# Patient Record
Sex: FEMALE | Race: ASIAN | NOT HISPANIC OR LATINO | URBAN - METROPOLITAN AREA
[De-identification: names, ages, dates, MRNs, and addresses within clinical notes are randomized per-mention and may not be internally consistent; named-entity substitution may affect disease eponyms.]

---

## 2018-02-25 ENCOUNTER — INPATIENT (INPATIENT)
Facility: HOSPITAL | Age: 83
LOS: 0 days | Discharge: LEFT BEFORE DISCHARGE | DRG: 639 | End: 2018-02-26
Attending: INTERNAL MEDICINE | Admitting: INTERNAL MEDICINE
Payer: MEDICARE

## 2018-02-25 VITALS
SYSTOLIC BLOOD PRESSURE: 165 MMHG | TEMPERATURE: 98 F | OXYGEN SATURATION: 100 % | DIASTOLIC BLOOD PRESSURE: 83 MMHG | RESPIRATION RATE: 20 BRPM | HEART RATE: 93 BPM

## 2018-02-25 DIAGNOSIS — H40.9 UNSPECIFIED GLAUCOMA: ICD-10-CM

## 2018-02-25 DIAGNOSIS — R55 SYNCOPE AND COLLAPSE: ICD-10-CM

## 2018-02-25 DIAGNOSIS — E11.9 TYPE 2 DIABETES MELLITUS WITHOUT COMPLICATIONS: ICD-10-CM

## 2018-02-25 DIAGNOSIS — E78.5 HYPERLIPIDEMIA, UNSPECIFIED: ICD-10-CM

## 2018-02-25 DIAGNOSIS — I10 ESSENTIAL (PRIMARY) HYPERTENSION: ICD-10-CM

## 2018-02-25 DIAGNOSIS — Z98.890 OTHER SPECIFIED POSTPROCEDURAL STATES: Chronic | ICD-10-CM

## 2018-02-25 LAB
ALBUMIN SERPL ELPH-MCNC: 4.2 G/DL — SIGNIFICANT CHANGE UP (ref 3.3–5)
ALP SERPL-CCNC: 60 U/L — SIGNIFICANT CHANGE UP (ref 40–120)
ALT FLD-CCNC: 22 U/L RC — SIGNIFICANT CHANGE UP (ref 10–45)
ANION GAP SERPL CALC-SCNC: 17 MMOL/L — SIGNIFICANT CHANGE UP (ref 5–17)
AST SERPL-CCNC: 35 U/L — SIGNIFICANT CHANGE UP (ref 10–40)
BASOPHILS # BLD AUTO: 0 K/UL — SIGNIFICANT CHANGE UP (ref 0–0.2)
BASOPHILS NFR BLD AUTO: 0.5 % — SIGNIFICANT CHANGE UP (ref 0–2)
BILIRUB SERPL-MCNC: 0.5 MG/DL — SIGNIFICANT CHANGE UP (ref 0.2–1.2)
BUN SERPL-MCNC: 21 MG/DL — SIGNIFICANT CHANGE UP (ref 7–23)
CALCIUM SERPL-MCNC: 9.1 MG/DL — SIGNIFICANT CHANGE UP (ref 8.4–10.5)
CHLORIDE SERPL-SCNC: 98 MMOL/L — SIGNIFICANT CHANGE UP (ref 96–108)
CK MB BLD-MCNC: 1.4 % — SIGNIFICANT CHANGE UP (ref 0–3.5)
CK MB BLD-MCNC: 3.1 % — SIGNIFICANT CHANGE UP (ref 0–3.5)
CK MB CFR SERPL CALC: 11.6 NG/ML — HIGH (ref 0–3.8)
CK MB CFR SERPL CALC: 12.6 NG/ML — HIGH (ref 0–3.8)
CK SERPL-CCNC: 412 U/L — HIGH (ref 25–170)
CK SERPL-CCNC: 848 U/L — HIGH (ref 25–170)
CO2 SERPL-SCNC: 24 MMOL/L — SIGNIFICANT CHANGE UP (ref 22–31)
CREAT SERPL-MCNC: 0.42 MG/DL — LOW (ref 0.5–1.3)
EOSINOPHIL # BLD AUTO: 0 K/UL — SIGNIFICANT CHANGE UP (ref 0–0.5)
EOSINOPHIL NFR BLD AUTO: 0.3 % — SIGNIFICANT CHANGE UP (ref 0–6)
GLUCOSE BLDC GLUCOMTR-MCNC: 119 MG/DL — HIGH (ref 70–99)
GLUCOSE SERPL-MCNC: 126 MG/DL — HIGH (ref 70–99)
HCT VFR BLD CALC: 37.3 % — SIGNIFICANT CHANGE UP (ref 34.5–45)
HGB BLD-MCNC: 12.5 G/DL — SIGNIFICANT CHANGE UP (ref 11.5–15.5)
LYMPHOCYTES # BLD AUTO: 0.6 K/UL — LOW (ref 1–3.3)
LYMPHOCYTES # BLD AUTO: 6 % — LOW (ref 13–44)
MCHC RBC-ENTMCNC: 31.5 PG — SIGNIFICANT CHANGE UP (ref 27–34)
MCHC RBC-ENTMCNC: 33.4 GM/DL — SIGNIFICANT CHANGE UP (ref 32–36)
MCV RBC AUTO: 94.3 FL — SIGNIFICANT CHANGE UP (ref 80–100)
MONOCYTES # BLD AUTO: 0.5 K/UL — SIGNIFICANT CHANGE UP (ref 0–0.9)
MONOCYTES NFR BLD AUTO: 5.3 % — SIGNIFICANT CHANGE UP (ref 2–14)
NEUTROPHILS # BLD AUTO: 8.8 K/UL — HIGH (ref 1.8–7.4)
NEUTROPHILS NFR BLD AUTO: 88 % — HIGH (ref 43–77)
PLATELET # BLD AUTO: 253 K/UL — SIGNIFICANT CHANGE UP (ref 150–400)
POTASSIUM SERPL-MCNC: 4.6 MMOL/L — SIGNIFICANT CHANGE UP (ref 3.5–5.3)
POTASSIUM SERPL-SCNC: 4.6 MMOL/L — SIGNIFICANT CHANGE UP (ref 3.5–5.3)
PROT SERPL-MCNC: 8.1 G/DL — SIGNIFICANT CHANGE UP (ref 6–8.3)
RBC # BLD: 3.96 M/UL — SIGNIFICANT CHANGE UP (ref 3.8–5.2)
RBC # FLD: 12.1 % — SIGNIFICANT CHANGE UP (ref 10.3–14.5)
SODIUM SERPL-SCNC: 139 MMOL/L — SIGNIFICANT CHANGE UP (ref 135–145)
TROPONIN T SERPL-MCNC: 0.01 NG/ML — SIGNIFICANT CHANGE UP (ref 0–0.06)
TROPONIN T SERPL-MCNC: <0.01 NG/ML — SIGNIFICANT CHANGE UP (ref 0–0.06)
WBC # BLD: 10 K/UL — SIGNIFICANT CHANGE UP (ref 3.8–10.5)
WBC # FLD AUTO: 10 K/UL — SIGNIFICANT CHANGE UP (ref 3.8–10.5)

## 2018-02-25 PROCEDURE — 72192 CT PELVIS W/O DYE: CPT | Mod: 26

## 2018-02-25 PROCEDURE — 73700 CT LOWER EXTREMITY W/O DYE: CPT | Mod: 26,RT

## 2018-02-25 PROCEDURE — 93010 ELECTROCARDIOGRAM REPORT: CPT

## 2018-02-25 PROCEDURE — 70450 CT HEAD/BRAIN W/O DYE: CPT | Mod: 26

## 2018-02-25 PROCEDURE — 73552 X-RAY EXAM OF FEMUR 2/>: CPT | Mod: 26,RT

## 2018-02-25 PROCEDURE — 73502 X-RAY EXAM HIP UNI 2-3 VIEWS: CPT | Mod: 26,RT

## 2018-02-25 PROCEDURE — 72125 CT NECK SPINE W/O DYE: CPT | Mod: 26

## 2018-02-25 PROCEDURE — 99285 EMERGENCY DEPT VISIT HI MDM: CPT | Mod: 25,GC

## 2018-02-25 PROCEDURE — 76377 3D RENDER W/INTRP POSTPROCES: CPT | Mod: 26

## 2018-02-25 PROCEDURE — 76377 3D RENDER W/INTRP POSTPROCES: CPT | Mod: 26,77

## 2018-02-25 RX ORDER — LATANOPROST 0.05 MG/ML
1 SOLUTION/ DROPS OPHTHALMIC; TOPICAL
Qty: 0 | Refills: 0 | Status: DISCONTINUED | OUTPATIENT
Start: 2018-02-25 | End: 2018-02-25

## 2018-02-25 RX ORDER — INFLUENZA VIRUS VACCINE 15; 15; 15; 15 UG/.5ML; UG/.5ML; UG/.5ML; UG/.5ML
0.5 SUSPENSION INTRAMUSCULAR ONCE
Qty: 0 | Refills: 0 | Status: DISCONTINUED | OUTPATIENT
Start: 2018-02-25 | End: 2018-02-26

## 2018-02-25 RX ORDER — DEXTROSE 50 % IN WATER 50 %
25 SYRINGE (ML) INTRAVENOUS ONCE
Qty: 0 | Refills: 0 | Status: DISCONTINUED | OUTPATIENT
Start: 2018-02-25 | End: 2018-02-26

## 2018-02-25 RX ORDER — LOSARTAN POTASSIUM 100 MG/1
50 TABLET, FILM COATED ORAL DAILY
Qty: 0 | Refills: 0 | Status: DISCONTINUED | OUTPATIENT
Start: 2018-02-25 | End: 2018-02-26

## 2018-02-25 RX ORDER — GLUCAGON INJECTION, SOLUTION 0.5 MG/.1ML
1 INJECTION, SOLUTION SUBCUTANEOUS ONCE
Qty: 0 | Refills: 0 | Status: DISCONTINUED | OUTPATIENT
Start: 2018-02-25 | End: 2018-02-26

## 2018-02-25 RX ORDER — POLYETHYLENE GLYCOL 3350 17 G/17G
17 POWDER, FOR SOLUTION ORAL DAILY
Qty: 0 | Refills: 0 | Status: DISCONTINUED | OUTPATIENT
Start: 2018-02-25 | End: 2018-02-26

## 2018-02-25 RX ORDER — HEPARIN SODIUM 5000 [USP'U]/ML
5000 INJECTION INTRAVENOUS; SUBCUTANEOUS EVERY 12 HOURS
Qty: 0 | Refills: 0 | Status: DISCONTINUED | OUTPATIENT
Start: 2018-02-25 | End: 2018-02-26

## 2018-02-25 RX ORDER — DEXTROSE 50 % IN WATER 50 %
12.5 SYRINGE (ML) INTRAVENOUS ONCE
Qty: 0 | Refills: 0 | Status: DISCONTINUED | OUTPATIENT
Start: 2018-02-25 | End: 2018-02-26

## 2018-02-25 RX ORDER — DEXTROSE 50 % IN WATER 50 %
1 SYRINGE (ML) INTRAVENOUS ONCE
Qty: 0 | Refills: 0 | Status: DISCONTINUED | OUTPATIENT
Start: 2018-02-25 | End: 2018-02-26

## 2018-02-25 RX ORDER — SODIUM CHLORIDE 9 MG/ML
1000 INJECTION, SOLUTION INTRAVENOUS
Qty: 0 | Refills: 0 | Status: DISCONTINUED | OUTPATIENT
Start: 2018-02-25 | End: 2018-02-26

## 2018-02-25 RX ORDER — INSULIN LISPRO 100/ML
VIAL (ML) SUBCUTANEOUS
Qty: 0 | Refills: 0 | Status: DISCONTINUED | OUTPATIENT
Start: 2018-02-25 | End: 2018-02-26

## 2018-02-25 RX ORDER — LATANOPROST 0.05 MG/ML
1 SOLUTION/ DROPS OPHTHALMIC; TOPICAL AT BEDTIME
Qty: 0 | Refills: 0 | Status: DISCONTINUED | OUTPATIENT
Start: 2018-02-25 | End: 2018-02-26

## 2018-02-25 RX ADMIN — LATANOPROST 1 DROP(S): 0.05 SOLUTION/ DROPS OPHTHALMIC; TOPICAL at 23:23

## 2018-02-25 NOTE — ED PROVIDER NOTE - OBJECTIVE STATEMENT
89yo F pmhx [place], DM, HTN p/w CC fall/LOC. Pt. poor historian, but explains that she went outside this morning and was then found by her neighbor in his backyard, unclear mechanism of fall, states it is because she "did not eat and felt weak". Unknown down time. Pt. accompanied by niece who states she is currently at her baseline. Pt. complaining of R hip pain. Denies HA CP SOB n/v/d.

## 2018-02-25 NOTE — H&P ADULT - HISTORY OF PRESENT ILLNESS
89yo F PMH [place], DM, HTN who was found by her neighbor in his backyard after an unwitnessed fall. The patient is a rather poor historian, but explains that she went outside this morning and was then found by her neighbor in his backyard, unclear mechanism of fall, states it is because she "did not eat and felt weak". Unknown down time. The patient is accompanied by her nieces who state she is currently at her baseline. The patient is complaining of R hip pain. Denies headache, chest pain nausea, vomiting preceding episode.

## 2018-02-25 NOTE — H&P ADULT - PROBLEM SELECTOR PLAN 2
check A1c  finger sticks with short acting insulin sliding scale  no oral meds for now  her episodes may well be secondary to recurrent hypoglycemia

## 2018-02-25 NOTE — ED ADULT NURSE REASSESSMENT NOTE - NS ED NURSE REASSESS COMMENT FT1
Report received from evon matias rn. Pt. is resting, easy work of breathing. on cardiac monitor, vss. Daughter at bedside. Safety maintained. Report received from evon matias rn. As per prior RN, MD mccormack confirmed urine sample not needed. Pt. is resting, easy work of breathing. on cardiac monitor, vss. Daughter at bedside. Safety maintained.

## 2018-02-25 NOTE — H&P ADULT - NSHPPHYSICALEXAM_GEN_ALL_CORE
PHYSICAL EXAM: vital signs as above  in no apparent distress  HEENT: QUIQUE EOMI bilaterally [place]  Neck: Supple, no JVD, no thyromegaly  Lungs: no rhonchi, no wheeze, no crackles  CVS: S1 S2 soft ejection systolic murmur best heard at left sternal border   Abdomen: no tenderness, no organomegaly, BS present  Neuro: AO x 3 no focal weakness, no sensory abnormalities  Psych: appropriate affect, talkative   Skin: warm, dry  Ext: no cyanosis or clubbing, bilateral + pitting edema  Msk: no joint swelling or deformities  Back: no CVA tenderness, no kyphosis/scoliosis

## 2018-02-25 NOTE — ED PROVIDER NOTE - CARDIAC PEDAL EDEMA
Problem: Non-pressure Ulcer Wound  Intervention: # Assess and measure wound every 7 days and if status is deteriorating.  Enter today's date indicating that the wound was measured.  This will produce a worklist task that will fire 7 days from the date entered to repeat the measurement.    Educated pt to cut off wraps if they become too loose or too tight, apply tubi  and call the clinic.         
absent

## 2018-02-25 NOTE — ED PROVIDER NOTE - MEDICAL DECISION MAKING DETAILS
89yo F pmhx HTN DM p/w CC unwitnessed fall, unknown mechanism. Will start syncope workup also XR R hip although atraumatic and ROM intact.

## 2018-02-25 NOTE — H&P ADULT - NSHPREVIEWOFSYSTEMS_GEN_ALL_CORE
Gen: no loss of wt or appetite  ENT: no dizziness or recent hearing loss  Ophth: no blurring of vision or loss of vision  Resp: No cough or sputum production  CVS: No chest pain or palpitations or orthopnea  GI: no N/V/D  : no dysuria, hematuria  Endo: no polyuria or excessive sweating  Neuro: no weakness or paresthesias  Psych: No suicidal or depressive ideation  Heme: No petechiae or easy bruising  Msk: No joint pain or swelling  Skin: No rash or itching  All other ROS negative

## 2018-02-25 NOTE — ED ADULT NURSE NOTE - OBJECTIVE STATEMENT
Pt BIBA from home s/p unwitnessed fall and unknown time on ground.  Pt lives at home with her niece, niece states that pt has no hx of dementia and was found outside in her neighbor's backyard this morning, unable to get up on her own.  Pt normally walks one block to get the morning newspaper every day on her own, somehow ended up in neighbor's backyard today, had no jacket on.  Pt states that she felt weak and fell down, has superficial abrasions above her R knee and redness to her L knee, no other ecchymosis or wounds.  Pt not a very good historian, but is coherent and very talkative, her niece at the bedside states that she at her baseline mentation.  Reports "muscle pain" in her R hip with movement but has no weakness in the leg, able to lift against gravity without difficulty or distress, no rotation.  Denies cp, sob, confusion, current weakness, ha, vision changes, or other symptoms. Pt BIBA from home s/p unwitnessed fall and unknown time on ground.  Pt lives at home with her niece, niece states that pt has no hx of dementia and was found outside in her neighbor's backyard this morning, unable to get up on her own.  Pt normally walks one block to get the morning newspaper every day on her own, somehow ended up in neighbor's backyard today, had no jacket on.  Pt states that she felt weak and fell down, denies LOC but is not able to recall who took her back to her house, has superficial abrasions above her R knee and redness to her L knee, no other ecchymosis or wounds.  Pt not a very good historian, but is coherent and very talkative, her niece at the bedside states that she at her baseline mentation.  Reports "muscle pain" in her R hip with movement but has no weakness in the leg, able to lift against gravity without difficulty or distress, no rotation.  Denies cp, sob, confusion, current weakness, ha, vision changes, or other symptoms.

## 2018-02-25 NOTE — ED ADULT NURSE REASSESSMENT NOTE - NS ED NURSE REASSESS COMMENT FT1
MD Love states no need to repeat cooags, pt currently sitting up in stretcher eating, will walk afterwards

## 2018-02-25 NOTE — ED ADULT NURSE REASSESSMENT NOTE - NS ED NURSE REASSESS COMMENT FT1
pt unable to walk, very unsteady on feet even with cane, family states she normally walks at home without any assistive devices, likely admit

## 2018-02-25 NOTE — ED PROVIDER NOTE - ATTENDING CONTRIBUTION TO CARE
89 y/o female who presents s/p fall that appears to have been mechanical as she denies having experienced any LOC, HA, visual disturbance, neck pain, CP, SOB, AP, N/V or focal weakness either prior to or after the event and presenting complaining only of pain in "the muscle" of her proximal right lower extremity. On exam, she appears very well and comfortable. VSs noted, head AT, neck supple c/ FROM s/ pain, paresthesia or midline c-spine ttp, lungs CTA, chest wall s/ ttp, cardiac sounds s/ audible m/r/g, abdomen soft, NT/ND, extremities s/ asymmetry c/ FROM X 4 including her R hip s/ any apparent hesitation or discomfort, skin s/ rash, abrasion or laceration and neurologically intact. Appropriate plain films appear unremarkable. Will attempt to ambulate. If she ambulates, she will be discharged in the care of those with whom she lives and who are bedside.

## 2018-02-25 NOTE — H&P ADULT - NSHPLABSRESULTS_GEN_ALL_CORE
noted on [place], including lab results and radiology studies   radiology studies notable for no fractures

## 2018-02-25 NOTE — H&P ADULT - PROBLEM SELECTOR PLAN 1
unclear etiology   cardiology attending to see  will follow recommendations   echocardiogram ordered for now

## 2018-02-25 NOTE — H&P ADULT - ASSESSMENT
89yo F PMH [place], DM, HTN who was found by her neighbor in his backyard after an unwitnessed fall, etiology unclear.

## 2018-02-25 NOTE — ED PROVIDER NOTE - SHIFT CHANGE DETAILS
Patient able to bear weight but unable to ambulate. CT ordered for further evaluation but even if negative, she will require admission due to her inability to ambulate and perhaps MRI for further evaluation of possible occult fracture.

## 2018-02-26 VITALS
HEART RATE: 74 BPM | SYSTOLIC BLOOD PRESSURE: 123 MMHG | TEMPERATURE: 98 F | OXYGEN SATURATION: 99 % | RESPIRATION RATE: 18 BRPM | DIASTOLIC BLOOD PRESSURE: 71 MMHG

## 2018-02-26 LAB
ANION GAP SERPL CALC-SCNC: 11 MMOL/L — SIGNIFICANT CHANGE UP (ref 5–17)
APPEARANCE UR: CLEAR — SIGNIFICANT CHANGE UP
BILIRUB UR-MCNC: NEGATIVE — SIGNIFICANT CHANGE UP
BUN SERPL-MCNC: 23 MG/DL — SIGNIFICANT CHANGE UP (ref 7–23)
CALCIUM SERPL-MCNC: 8.9 MG/DL — SIGNIFICANT CHANGE UP (ref 8.4–10.5)
CHLORIDE SERPL-SCNC: 101 MMOL/L — SIGNIFICANT CHANGE UP (ref 96–108)
CK MB BLD-MCNC: 1.1 % — SIGNIFICANT CHANGE UP (ref 0–3.5)
CK MB CFR SERPL CALC: 9.7 NG/ML — HIGH (ref 0–3.8)
CK SERPL-CCNC: 867 U/L — HIGH (ref 25–170)
CO2 SERPL-SCNC: 25 MMOL/L — SIGNIFICANT CHANGE UP (ref 22–31)
COLOR SPEC: YELLOW — SIGNIFICANT CHANGE UP
CREAT SERPL-MCNC: 0.56 MG/DL — SIGNIFICANT CHANGE UP (ref 0.5–1.3)
DIFF PNL FLD: ABNORMAL
EPI CELLS # UR: SIGNIFICANT CHANGE UP /HPF
GLUCOSE BLDC GLUCOMTR-MCNC: 117 MG/DL — HIGH (ref 70–99)
GLUCOSE BLDC GLUCOMTR-MCNC: 133 MG/DL — HIGH (ref 70–99)
GLUCOSE SERPL-MCNC: 107 MG/DL — HIGH (ref 70–99)
GLUCOSE UR QL: NEGATIVE — SIGNIFICANT CHANGE UP
HBA1C BLD-MCNC: 6.2 % — HIGH (ref 4–5.6)
HCT VFR BLD CALC: 32.9 % — LOW (ref 34.5–45)
HGB BLD-MCNC: 10.2 G/DL — LOW (ref 11.5–15.5)
KETONES UR-MCNC: NEGATIVE — SIGNIFICANT CHANGE UP
LEUKOCYTE ESTERASE UR-ACNC: ABNORMAL
MCHC RBC-ENTMCNC: 28.8 PG — SIGNIFICANT CHANGE UP (ref 27–34)
MCHC RBC-ENTMCNC: 31 GM/DL — LOW (ref 32–36)
MCV RBC AUTO: 92.9 FL — SIGNIFICANT CHANGE UP (ref 80–100)
NITRITE UR-MCNC: NEGATIVE — SIGNIFICANT CHANGE UP
PH UR: 6.5 — SIGNIFICANT CHANGE UP (ref 5–8)
PLATELET # BLD AUTO: 243 K/UL — SIGNIFICANT CHANGE UP (ref 150–400)
POTASSIUM SERPL-MCNC: 3.9 MMOL/L — SIGNIFICANT CHANGE UP (ref 3.5–5.3)
POTASSIUM SERPL-SCNC: 3.9 MMOL/L — SIGNIFICANT CHANGE UP (ref 3.5–5.3)
PROT UR-MCNC: NEGATIVE — SIGNIFICANT CHANGE UP
RBC # BLD: 3.54 M/UL — LOW (ref 3.8–5.2)
RBC # FLD: 13.7 % — SIGNIFICANT CHANGE UP (ref 10.3–14.5)
RBC CASTS # UR COMP ASSIST: ABNORMAL /HPF (ref 0–2)
SODIUM SERPL-SCNC: 137 MMOL/L — SIGNIFICANT CHANGE UP (ref 135–145)
SP GR SPEC: 1.02 — SIGNIFICANT CHANGE UP (ref 1.01–1.02)
TROPONIN T SERPL-MCNC: <0.01 NG/ML — SIGNIFICANT CHANGE UP (ref 0–0.06)
TSH SERPL-MCNC: 4.56 UIU/ML — HIGH (ref 0.27–4.2)
UROBILINOGEN FLD QL: NEGATIVE — SIGNIFICANT CHANGE UP
WBC # BLD: 6.9 K/UL — SIGNIFICANT CHANGE UP (ref 3.8–10.5)
WBC # FLD AUTO: 6.9 K/UL — SIGNIFICANT CHANGE UP (ref 3.8–10.5)
WBC UR QL: SIGNIFICANT CHANGE UP /HPF (ref 0–5)

## 2018-02-26 PROCEDURE — 83036 HEMOGLOBIN GLYCOSYLATED A1C: CPT

## 2018-02-26 PROCEDURE — 72125 CT NECK SPINE W/O DYE: CPT

## 2018-02-26 PROCEDURE — 72192 CT PELVIS W/O DYE: CPT

## 2018-02-26 PROCEDURE — 82550 ASSAY OF CK (CPK): CPT

## 2018-02-26 PROCEDURE — 73502 X-RAY EXAM HIP UNI 2-3 VIEWS: CPT

## 2018-02-26 PROCEDURE — 84443 ASSAY THYROID STIM HORMONE: CPT

## 2018-02-26 PROCEDURE — 93970 EXTREMITY STUDY: CPT | Mod: 26

## 2018-02-26 PROCEDURE — 84484 ASSAY OF TROPONIN QUANT: CPT

## 2018-02-26 PROCEDURE — 99285 EMERGENCY DEPT VISIT HI MDM: CPT | Mod: 25

## 2018-02-26 PROCEDURE — 80053 COMPREHEN METABOLIC PANEL: CPT

## 2018-02-26 PROCEDURE — 82962 GLUCOSE BLOOD TEST: CPT

## 2018-02-26 PROCEDURE — 83735 ASSAY OF MAGNESIUM: CPT

## 2018-02-26 PROCEDURE — 72190 X-RAY EXAM OF PELVIS: CPT

## 2018-02-26 PROCEDURE — 93005 ELECTROCARDIOGRAM TRACING: CPT

## 2018-02-26 PROCEDURE — 82553 CREATINE MB FRACTION: CPT

## 2018-02-26 PROCEDURE — 70450 CT HEAD/BRAIN W/O DYE: CPT

## 2018-02-26 PROCEDURE — 87086 URINE CULTURE/COLONY COUNT: CPT

## 2018-02-26 PROCEDURE — 73700 CT LOWER EXTREMITY W/O DYE: CPT

## 2018-02-26 PROCEDURE — 80048 BASIC METABOLIC PNL TOTAL CA: CPT

## 2018-02-26 PROCEDURE — 81001 URINALYSIS AUTO W/SCOPE: CPT

## 2018-02-26 PROCEDURE — 93970 EXTREMITY STUDY: CPT

## 2018-02-26 PROCEDURE — 85027 COMPLETE CBC AUTOMATED: CPT

## 2018-02-26 PROCEDURE — 84100 ASSAY OF PHOSPHORUS: CPT

## 2018-02-26 PROCEDURE — 73552 X-RAY EXAM OF FEMUR 2/>: CPT

## 2018-02-26 PROCEDURE — 76377 3D RENDER W/INTRP POSTPROCES: CPT

## 2018-02-26 RX ADMIN — HEPARIN SODIUM 5000 UNIT(S): 5000 INJECTION INTRAVENOUS; SUBCUTANEOUS at 05:20

## 2018-02-26 RX ADMIN — LOSARTAN POTASSIUM 50 MILLIGRAM(S): 100 TABLET, FILM COATED ORAL at 05:20

## 2018-02-26 NOTE — PROGRESS NOTE ADULT - PROBLEM SELECTOR PLAN 2
A1c 6.2   finger sticks with short acting insulin sliding scale  no oral meds for now  her episodes may well be secondary to recurrent hypoglycemia  no hypoglycemia observed so far however

## 2018-02-26 NOTE — PROGRESS NOTE ADULT - ASSESSMENT
89yo F PMH [place], DM, HTN who was found by her neighbor in his backyard after an unwitnessed fall, etiology unclear.

## 2018-02-26 NOTE — CONSULT NOTE ADULT - ASSESSMENT
89 year old woman with history of DM, HTN admitted s/p fall vs syncope.    1. Fall vs syncope  no obvious cardiac etiology  check echo to r/o cmp, valve disease  hydrate  check orthostatics  r/o hip fx    2. Hypertension  cont losartan    dvt ppx 89 year old woman with history of DM, HTN admitted s/p fall vs syncope.    1. Fall vs syncope  no obvious cardiac etiology  check echo to r/o cmp, valve disease  hydrate  check orthostatics  r/o hip fx  f/u ekg    2. Hypertension  cont losartan    dvt ppx

## 2018-02-26 NOTE — CONSULT NOTE ADULT - SUBJECTIVE AND OBJECTIVE BOX
CARDIOLOGY CONSULT NOTE - DR. BILLINGS    CHIEF COMPLAINT/REASON FOR CONSULT:    HPI:  Patient is a 89 year old woman with history of DM, HTN admitted s/p fall vs syncope.  She denies any preceding symptoms  Patient denies any chest pain, dyspnea, palpitations, cough, syncope, edema, exertional symptoms, nausea, abdominal pain, fever, chills,  or rash.  Denies focal neuro deficit  Denies any history of CAD, MI, valve disease, cardiomyopathy, or congenital heart disease.    PAST MEDICAL & SURGICAL HISTORY:  Glaucoma  HLD (hyperlipidemia)  Diabetes  Hypertension  H/O eye surgery: right eye glaucoma surgery in the past        PREVIOUS DIAGNOSTIC TESTING:    [ ] Echocardiogram:  [ ]  Catheterization:  [ ] Stress Test:  	    MEDICATIONS:  MEDICATIONS  (STANDING):  dextrose 5%. 1000 milliLiter(s) (50 mL/Hr) IV Continuous <Continuous>  dextrose 50% Injectable 12.5 Gram(s) IV Push once  dextrose 50% Injectable 25 Gram(s) IV Push once  dextrose 50% Injectable 25 Gram(s) IV Push once  heparin  Injectable 5000 Unit(s) SubCutaneous every 12 hours  influenza   Vaccine 0.5 milliLiter(s) IntraMuscular once  insulin lispro (HumaLOG) corrective regimen sliding scale   SubCutaneous three times a day before meals  latanoprost 0.005% Ophthalmic Solution 1 Drop(s) Both EYES at bedtime  losartan 50 milliGRAM(s) Oral daily  polyethylene glycol 3350 17 Gram(s) Oral daily      FAMILY HISTORY:  No pertinent family history in first degree relatives      SOCIAL HISTORY:    [x ] Non-smoker  [ ] Smoker  [ ] Alcohol    Allergies    No Known Allergies    Intolerances    	    REVIEW OF SYSTEMS:  CONSTITUTIONAL: No fever, weight loss, or fatigue  EYES: No eye pain, visual disturbances, or discharge  ENMT:  No difficulty hearing, tinnitus, vertigo; No sinus or throat pain  NECK: No pain or stiffness  RESPIRATORY: No cough, wheezing, chills or hemoptysis; No Shortness of Breath  CARDIOVASCULAR: No chest pain, palpitations, passing out, dizziness, or leg swelling  GASTROINTESTINAL: No abdominal or epigastric pain. No nausea, vomiting, or hematemesis; No diarrhea or constipation. No melena or hematochezia.  GENITOURINARY: No dysuria, frequency, hematuria, or incontinence  NEUROLOGICAL: No headaches, memory loss, loss of strength, numbness, or tremors  SKIN: No itching, burning, rashes, or lesions   	    [ ] All others negative	  [ ] Unable to obtain    PHYSICAL EXAM:  T(C): 36.4 (02-26-18 @ 07:25), Max: 36.9 (02-25-18 @ 19:19)  HR: 74 (02-26-18 @ 07:25) (68 - 95)  BP: 123/71 (02-26-18 @ 07:25) (116/53 - 165/85)  RR: 18 (02-26-18 @ 07:25) (18 - 22)  SpO2: 99% (02-26-18 @ 07:25) (97% - 100%)  Wt(kg): --  I&O's Summary      Appearance: Normal	  Psychiatry: A & O x 3, Mood & affect appropriate  HEENT:   Normal oral mucosa, PERRL, EOMI	  Lymphatic: No lymphadenopathy  Cardiovascular: Normal S1 S2,RRR, sm  Respiratory: Lungs clear to auscultation	  Gastrointestinal:  Soft, Non-tender, + BS	  Skin: No rashes, No ecchymoses, No cyanosis	  Neurologic: Non-focal  Extremities: Normal range of motion, No clubbing, cyanosis or edema      TELEMETRY: 	    ECG:  	could not retrieve er ekg  RADIOLOGY:  OTHER: 	  	  LABS:	 	    CARDIAC MARKERS:                                  10.2   6.90  )-----------( 243      ( 26 Feb 2018 07:03 )             32.9     02-26    137  |  101  |  23  ----------------------------<  107<H>  3.9   |  25  |  0.56    Ca    8.9      26 Feb 2018 05:46  Phos  3.1     02-25  Mg     1.9     02-25    TPro  8.1  /  Alb  4.2  /  TBili  0.5  /  DBili  x   /  AST  35  /  ALT  22  /  AlkPhos  60  02-25      proBNP:   Lipid Profile:   HgA1c: Hemoglobin A1C, Whole Blood: 6.2 % (02-26 @ 07:03)    TSH: Thyroid Stimulating Hormone, Serum: 4.56 uIU/mL (02-26 @ 07:01)      ASSESSMENT/PLAN:

## 2018-02-26 NOTE — CHART NOTE - NSCHARTNOTEFT_GEN_A_CORE
89 year old female who is A+O x 3 is  leaving AMA, discussed risks of leaving with patient and her niece's but despite these risks she is insistent on leaving. She will be going home with her niece who she lives with and will follow up with her PMD, d/w Dr Mckeon

## 2018-02-26 NOTE — PROGRESS NOTE ADULT - SUBJECTIVE AND OBJECTIVE BOX
Patient is a 89y old  Female who presents with a chief complaint of unwitnessed fall (2018 20:42)      SUBJECTIVE / OVERNIGHT EVENTS: no overnight events  feels  'fine'    ROS:  Resp: No cough no sputum production  CVS: No chest pain no palpitations no orthopnea  GI: no N/V/D  : no dysuria, no hematuria  Neuro: no weakness no paresthesias  Heme: No petechiae no easy bruising  Msk: No joint pain no swelling  Skin: No rash no itching  All other ROS negative       MEDICATIONS  (STANDING):  dextrose 5%. 1000 milliLiter(s) (50 mL/Hr) IV Continuous <Continuous>  dextrose 50% Injectable 12.5 Gram(s) IV Push once  dextrose 50% Injectable 25 Gram(s) IV Push once  dextrose 50% Injectable 25 Gram(s) IV Push once  heparin  Injectable 5000 Unit(s) SubCutaneous every 12 hours  influenza   Vaccine 0.5 milliLiter(s) IntraMuscular once  insulin lispro (HumaLOG) corrective regimen sliding scale   SubCutaneous three times a day before meals  latanoprost 0.005% Ophthalmic Solution 1 Drop(s) Both EYES at bedtime  losartan 50 milliGRAM(s) Oral daily  polyethylene glycol 3350 17 Gram(s) Oral daily    MEDICATIONS  (PRN):  dextrose Gel 1 Dose(s) Oral once PRN Blood Glucose LESS THAN 70 milliGRAM(s)/deciliter  glucagon  Injectable 1 milliGRAM(s) IntraMuscular once PRN Glucose LESS THAN 70 milligrams/deciliter        CAPILLARY BLOOD GLUCOSE      POCT Blood Glucose.: 117 mg/dL (2018 08:53)  POCT Blood Glucose.: 119 mg/dL (2018 21:09)  POCT Blood Glucose.: 119 mg/dL (2018 12:53)    I&O's Summary    PHYSICAL EXAM: vital signs as above  in no apparent distress  HEENT: QUIQUE EOMI bilaterally [place]  Neck: Supple, no JVD, no thyromegaly  Lungs: no rhonchi, no wheeze, no crackles  CVS: S1 S2 soft ejection systolic murmur best heard at left sternal border   Abdomen: no tenderness, no organomegaly, BS present  Neuro: AO x 3 no focal weakness, no sensory abnormalities  Psych: appropriate affect, talkative   Skin: warm, dry  Ext: no cyanosis or clubbing, bilateral + pitting edema improved overnight   Msk: no joint swelling or deformities  Back: no CVA tenderness, no kyphosis/scoliosis    LABS:                        10.2   6.90  )-----------( 243      ( 2018 07:03 )             32.9         137  |  101  |  23  ----------------------------<  107<H>  3.9   |  25  |  0.56    Ca    8.9      2018 05:46  Phos  3.1     -  Mg     1.9         TPro  8.1  /  Alb  4.2  /  TBili  0.5  /  DBili  x   /  AST  35  /  ALT  22  /  AlkPhos  60        CARDIAC MARKERS ( 2018 05:46 )  x     / <0.01 ng/mL / 867 U/L / x     / 9.7 ng/mL  CARDIAC MARKERS ( 2018 22:13 )  x     / <0.01 ng/mL / 848 U/L / x     / 11.6 ng/mL  CARDIAC MARKERS ( 2018 13:09 )  x     / 0.01 ng/mL / 412 U/L / x     / 12.6 ng/mL      Urinalysis Basic - ( 2018 05:15 )    Color: Yellow / Appearance: Clear / S.020 / pH: x  Gluc: x / Ketone: Negative  / Bili: Negative / Urobili: Negative   Blood: x / Protein: Negative / Nitrite: Negative   Leuk Esterase: Small / RBC: 5-10 /HPF / WBC 6-10 /HPF   Sq Epi: x / Non Sq Epi: OCC /HPF / Bacteria: x          Consultant(s) Notes Reviewed:      Care Discussed with Consultants/Other Providers:    Contact Number, Dr Mckeon [phone]

## 2018-02-26 NOTE — PROGRESS NOTE ADULT - ATTENDING COMMENTS
discussed with patient in detail, all questions answered  discussed with family at bedside in detail  d/w cardiology attending

## 2018-02-26 NOTE — PROGRESS NOTE ADULT - PROBLEM SELECTOR PLAN 1
unclear etiology   cardiology attending help appreciated   will follow recommendations   echocardiogram pending

## 2018-02-27 LAB
CULTURE RESULTS: SIGNIFICANT CHANGE UP
SPECIMEN SOURCE: SIGNIFICANT CHANGE UP

## 2020-07-02 ENCOUNTER — INPATIENT (INPATIENT)
Facility: HOSPITAL | Age: 85
LOS: 5 days | Discharge: NURSING, REHAB OR LONG-TERM CARE | DRG: 872 | End: 2020-07-08
Attending: HOSPITALIST | Admitting: STUDENT IN AN ORGANIZED HEALTH CARE EDUCATION/TRAINING PROGRAM
Payer: MEDICARE

## 2020-07-02 VITALS
OXYGEN SATURATION: 96 % | SYSTOLIC BLOOD PRESSURE: 105 MMHG | WEIGHT: 89.95 LBS | HEART RATE: 85 BPM | HEIGHT: 59 IN | TEMPERATURE: 99 F | DIASTOLIC BLOOD PRESSURE: 61 MMHG | RESPIRATION RATE: 20 BRPM

## 2020-07-02 DIAGNOSIS — Z98.890 OTHER SPECIFIED POSTPROCEDURAL STATES: Chronic | ICD-10-CM

## 2020-07-02 LAB
ALBUMIN SERPL ELPH-MCNC: 3.6 G/DL — SIGNIFICANT CHANGE UP (ref 3.3–5)
ALP SERPL-CCNC: 113 U/L — SIGNIFICANT CHANGE UP (ref 40–120)
ALT FLD-CCNC: 126 U/L — HIGH (ref 10–45)
ANION GAP SERPL CALC-SCNC: 14 MMOL/L — SIGNIFICANT CHANGE UP (ref 5–17)
AST SERPL-CCNC: 275 U/L — HIGH (ref 10–40)
BASE EXCESS BLDV CALC-SCNC: 0.8 MMOL/L — SIGNIFICANT CHANGE UP (ref -2–2)
BASOPHILS # BLD AUTO: 0 K/UL — SIGNIFICANT CHANGE UP (ref 0–0.2)
BASOPHILS NFR BLD AUTO: 0 % — SIGNIFICANT CHANGE UP (ref 0–2)
BILIRUB SERPL-MCNC: 0.5 MG/DL — SIGNIFICANT CHANGE UP (ref 0.2–1.2)
BUN SERPL-MCNC: 16 MG/DL — SIGNIFICANT CHANGE UP (ref 7–23)
CA-I SERPL-SCNC: 1.13 MMOL/L — SIGNIFICANT CHANGE UP (ref 1.12–1.3)
CALCIUM SERPL-MCNC: 8.8 MG/DL — SIGNIFICANT CHANGE UP (ref 8.4–10.5)
CHLORIDE BLDV-SCNC: 101 MMOL/L — SIGNIFICANT CHANGE UP (ref 96–108)
CHLORIDE SERPL-SCNC: 96 MMOL/L — SIGNIFICANT CHANGE UP (ref 96–108)
CO2 BLDV-SCNC: 25 MMOL/L — SIGNIFICANT CHANGE UP (ref 22–30)
CO2 SERPL-SCNC: 20 MMOL/L — LOW (ref 22–31)
CREAT SERPL-MCNC: 0.59 MG/DL — SIGNIFICANT CHANGE UP (ref 0.5–1.3)
EOSINOPHIL # BLD AUTO: 0 K/UL — SIGNIFICANT CHANGE UP (ref 0–0.5)
EOSINOPHIL NFR BLD AUTO: 0 % — SIGNIFICANT CHANGE UP (ref 0–6)
GAS PNL BLDV: 130 MMOL/L — LOW (ref 135–145)
GAS PNL BLDV: SIGNIFICANT CHANGE UP
GIANT PLATELETS BLD QL SMEAR: PRESENT — SIGNIFICANT CHANGE UP
GLUCOSE BLDV-MCNC: 152 MG/DL — HIGH (ref 70–99)
GLUCOSE SERPL-MCNC: 152 MG/DL — HIGH (ref 70–99)
HCO3 BLDV-SCNC: 24 MMOL/L — SIGNIFICANT CHANGE UP (ref 21–29)
HCT VFR BLD CALC: 36.5 % — SIGNIFICANT CHANGE UP (ref 34.5–45)
HCT VFR BLDA CALC: 38 % — LOW (ref 39–50)
HGB BLD CALC-MCNC: 12.5 G/DL — SIGNIFICANT CHANGE UP (ref 11.5–15.5)
HGB BLD-MCNC: 12.1 G/DL — SIGNIFICANT CHANGE UP (ref 11.5–15.5)
LACTATE BLDV-MCNC: 2 MMOL/L — SIGNIFICANT CHANGE UP (ref 0.7–2)
LYMPHOCYTES # BLD AUTO: 1.05 K/UL — SIGNIFICANT CHANGE UP (ref 1–3.3)
LYMPHOCYTES # BLD AUTO: 11.3 % — LOW (ref 13–44)
MANUAL SMEAR VERIFICATION: SIGNIFICANT CHANGE UP
MCHC RBC-ENTMCNC: 29.6 PG — SIGNIFICANT CHANGE UP (ref 27–34)
MCHC RBC-ENTMCNC: 33.2 GM/DL — SIGNIFICANT CHANGE UP (ref 32–36)
MCV RBC AUTO: 89.2 FL — SIGNIFICANT CHANGE UP (ref 80–100)
MONOCYTES # BLD AUTO: 0.24 K/UL — SIGNIFICANT CHANGE UP (ref 0–0.9)
MONOCYTES NFR BLD AUTO: 2.6 % — SIGNIFICANT CHANGE UP (ref 2–14)
MYELOCYTES NFR BLD: 0.9 % — HIGH (ref 0–0)
NEUTROPHILS # BLD AUTO: 7.51 K/UL — HIGH (ref 1.8–7.4)
NEUTROPHILS NFR BLD AUTO: 70.4 % — SIGNIFICANT CHANGE UP (ref 43–77)
NEUTS BAND # BLD: 10.4 % — HIGH (ref 0–8)
PCO2 BLDV: 36 MMHG — SIGNIFICANT CHANGE UP (ref 35–50)
PH BLDV: 7.44 — SIGNIFICANT CHANGE UP (ref 7.35–7.45)
PLAT MORPH BLD: NORMAL — SIGNIFICANT CHANGE UP
PLATELET # BLD AUTO: 190 K/UL — SIGNIFICANT CHANGE UP (ref 150–400)
PO2 BLDV: 44 MMHG — SIGNIFICANT CHANGE UP (ref 25–45)
POTASSIUM BLDV-SCNC: 4.1 MMOL/L — SIGNIFICANT CHANGE UP (ref 3.5–5.3)
POTASSIUM SERPL-MCNC: 4.2 MMOL/L — SIGNIFICANT CHANGE UP (ref 3.5–5.3)
POTASSIUM SERPL-SCNC: 4.2 MMOL/L — SIGNIFICANT CHANGE UP (ref 3.5–5.3)
PROT SERPL-MCNC: 7.1 G/DL — SIGNIFICANT CHANGE UP (ref 6–8.3)
RBC # BLD: 4.09 M/UL — SIGNIFICANT CHANGE UP (ref 3.8–5.2)
RBC # FLD: 14.1 % — SIGNIFICANT CHANGE UP (ref 10.3–14.5)
RBC BLD AUTO: SIGNIFICANT CHANGE UP
SAO2 % BLDV: 78 % — SIGNIFICANT CHANGE UP (ref 67–88)
SARS-COV-2 RNA SPEC QL NAA+PROBE: SIGNIFICANT CHANGE UP
SODIUM SERPL-SCNC: 130 MMOL/L — LOW (ref 135–145)
TROPONIN T, HIGH SENSITIVITY RESULT: 11 NG/L — SIGNIFICANT CHANGE UP (ref 0–51)
TROPONIN T, HIGH SENSITIVITY RESULT: 12 NG/L — SIGNIFICANT CHANGE UP (ref 0–51)
VARIANT LYMPHS # BLD: 4.4 % — SIGNIFICANT CHANGE UP (ref 0–6)
WBC # BLD: 9.29 K/UL — SIGNIFICANT CHANGE UP (ref 3.8–10.5)
WBC # FLD AUTO: 9.29 K/UL — SIGNIFICANT CHANGE UP (ref 3.8–10.5)

## 2020-07-02 PROCEDURE — 99285 EMERGENCY DEPT VISIT HI MDM: CPT | Mod: CS,GC

## 2020-07-02 PROCEDURE — 72170 X-RAY EXAM OF PELVIS: CPT | Mod: 26

## 2020-07-02 PROCEDURE — 93010 ELECTROCARDIOGRAM REPORT: CPT | Mod: GC

## 2020-07-02 PROCEDURE — 71045 X-RAY EXAM CHEST 1 VIEW: CPT | Mod: 26

## 2020-07-02 PROCEDURE — 70450 CT HEAD/BRAIN W/O DYE: CPT | Mod: 26

## 2020-07-02 PROCEDURE — 74177 CT ABD & PELVIS W/CONTRAST: CPT | Mod: 26

## 2020-07-02 RX ORDER — SODIUM CHLORIDE 9 MG/ML
1000 INJECTION, SOLUTION INTRAVENOUS ONCE
Refills: 0 | Status: COMPLETED | OUTPATIENT
Start: 2020-07-02 | End: 2020-07-02

## 2020-07-02 RX ADMIN — SODIUM CHLORIDE 1000 MILLILITER(S): 9 INJECTION, SOLUTION INTRAVENOUS at 23:52

## 2020-07-02 NOTE — ED PROVIDER NOTE - CLINICAL SUMMARY MEDICAL DECISION MAKING FREE TEXT BOX
DH: 89 year old female BIBEMS for generalized weakness, fatigue, and decreased PO intake per EMS. Patient awake, unable to have conversation 2/2 ?language barrier. Family unreachable. Plan for ekg, labs, CT head, UA/UCx r/o UTI, reassess. DH: 89 year old female BIBEMS for generalized weakness, fatigue, and decreased PO intake per EMS. Patient awake, unable to have conversation 2/2 ?language barrier. Family unreachable. Plan for ekg, labs, CT head, UA/UCx r/o UTI, reassess.    Attending Statement: Agree with the above.  89 y F BIBEMS from ?home? c complaint of weakness, decreased PO.  Unable to call family for collateral information (no answer and given numbers and patient has no phone numbers available on her person).  Pt mandarin speaking though knows some english on my examination, able to say her name and that she is in a hospital but otherwise is unable to provide meaningful hx.  Reported to have a h/o dementia.  Aside from malaise/lethargy patient is nonfocal on examination.  Broad ddx including metabolic, infectious, CNS/vascular pathology.  Possible acute coronary syndrome though EKG is nsr without elevations.  Plan as above.  Will require admission.  --BMM

## 2020-07-02 NOTE — ED ADULT NURSE REASSESSMENT NOTE - NS ED NURSE REASSESS COMMENT FT1
Pt straight cathed using sterile technique. Pt tolerated procedure well. 400 ccs of dark yellow urine output. Sterile specimen collected. UA and Culture sent.

## 2020-07-02 NOTE — ED PROVIDER NOTE - PHYSICAL EXAMINATION
GENERAL: awake and looking around, unable to communicate 2/2 ?language barrier  HEENT: NCAT, EOMI, oral mucosa moist, normal conjunctiva  RESP: CTAB, no respiratory distress, no wheezes/rhonchi/rales  CV: RRR, no murmurs/rubs/gallops  ABDOMEN: soft, non-tender, non-distended, no guarding  MSK: no visible deformities  SKIN: warm, normal color, well perfused, no rash    -Nik Aguilera, PGY2

## 2020-07-02 NOTE — ED ADULT NURSE NOTE - OBJECTIVE STATEMENT
89y female brought in by EMS c/o generalized weakness. Hx of HTN, HLD, DM and [place]. Pt is Mandarin speaking. As per EMS, son reports pt's alzheimer's is progressing. Pt has been weak, normally ambulatory at baseline but now requiring assistance to ambulate. EMS also states son report poor PO intake.     Pt present to ED at 1855. Pt awake and calm. EMS report VSS. [facility]. Physical exam to be performed.

## 2020-07-02 NOTE — ED ADULT NURSE REASSESSMENT NOTE - NS ED NURSE REASSESS COMMENT FT1
Report received from ISABELA MARQUES in [facility]. Pt currently does not appear to be in acute distress. Pt does not appear to be SOB, pt does not have non-verbal indicators of chest pain, n/v, issues with bowel or bladder such has diarrhea or incontinence. pt currently resting. pt awaiting lab results. safety maintained. Report received from ISABELA MARQUES in [facility]. Pt currently does not appear to be in acute distress. Pt does not appear to be SOB, pt does not have non-verbal indicators of chest pain, n/v, issues with bowel or bladder such has diarrhea or incontinence. With MD Aguilera pt unable to answer questions with . pt currently resting. pt awaiting lab results. safety maintained.

## 2020-07-02 NOTE — ED PROVIDER NOTE - OBJECTIVE STATEMENT
89 year old female PMH HTN, NIDDM, dementia, p/w generalized weakness. Per EMS, patient has been having decreased PO intake and is more fatigued lately. Patient Mandarin speaking per triage note but patient unable to answer questions when  utilized. Emergency contact number not answering calls. HPI limited.

## 2020-07-03 DIAGNOSIS — M62.82 RHABDOMYOLYSIS: ICD-10-CM

## 2020-07-03 DIAGNOSIS — A41.9 SEPSIS, UNSPECIFIED ORGANISM: ICD-10-CM

## 2020-07-03 DIAGNOSIS — E11.9 TYPE 2 DIABETES MELLITUS WITHOUT COMPLICATIONS: ICD-10-CM

## 2020-07-03 DIAGNOSIS — Z02.9 ENCOUNTER FOR ADMINISTRATIVE EXAMINATIONS, UNSPECIFIED: ICD-10-CM

## 2020-07-03 DIAGNOSIS — R53.1 WEAKNESS: ICD-10-CM

## 2020-07-03 DIAGNOSIS — F03.90 UNSPECIFIED DEMENTIA WITHOUT BEHAVIORAL DISTURBANCE: ICD-10-CM

## 2020-07-03 DIAGNOSIS — I10 ESSENTIAL (PRIMARY) HYPERTENSION: ICD-10-CM

## 2020-07-03 DIAGNOSIS — E87.1 HYPO-OSMOLALITY AND HYPONATREMIA: ICD-10-CM

## 2020-07-03 DIAGNOSIS — Z29.9 ENCOUNTER FOR PROPHYLACTIC MEASURES, UNSPECIFIED: ICD-10-CM

## 2020-07-03 DIAGNOSIS — E86.0 DEHYDRATION: ICD-10-CM

## 2020-07-03 DIAGNOSIS — R65.10 SYSTEMIC INFLAMMATORY RESPONSE SYNDROME (SIRS) OF NON-INFECTIOUS ORIGIN WITHOUT ACUTE ORGAN DYSFUNCTION: ICD-10-CM

## 2020-07-03 DIAGNOSIS — R74.0 NONSPECIFIC ELEVATION OF LEVELS OF TRANSAMINASE AND LACTIC ACID DEHYDROGENASE [LDH]: ICD-10-CM

## 2020-07-03 LAB
APPEARANCE UR: CLEAR — SIGNIFICANT CHANGE UP
BACTERIA # UR AUTO: NEGATIVE — SIGNIFICANT CHANGE UP
BASOPHILS # BLD AUTO: 0.06 K/UL — SIGNIFICANT CHANGE UP (ref 0–0.2)
BASOPHILS NFR BLD AUTO: 0.6 % — SIGNIFICANT CHANGE UP (ref 0–2)
BILIRUB UR-MCNC: NEGATIVE — SIGNIFICANT CHANGE UP
COLOR SPEC: YELLOW — SIGNIFICANT CHANGE UP
CRP SERPL-MCNC: 3.55 MG/DL — HIGH (ref 0–0.4)
D DIMER BLD IA.RAPID-MCNC: 1893 NG/ML DDU — HIGH
DIFF PNL FLD: ABNORMAL
EOSINOPHIL # BLD AUTO: 0.02 K/UL — SIGNIFICANT CHANGE UP (ref 0–0.5)
EOSINOPHIL NFR BLD AUTO: 0.2 % — SIGNIFICANT CHANGE UP (ref 0–6)
EPI CELLS # UR: 6 /HPF — HIGH
ERYTHROCYTE [SEDIMENTATION RATE] IN BLOOD: 101 MM/HR — HIGH (ref 0–20)
GLUCOSE UR QL: NEGATIVE — SIGNIFICANT CHANGE UP
HCT VFR BLD CALC: 34.5 % — SIGNIFICANT CHANGE UP (ref 34.5–45)
HGB BLD-MCNC: 11.2 G/DL — LOW (ref 11.5–15.5)
HYALINE CASTS # UR AUTO: 16 /LPF — HIGH (ref 0–2)
IMM GRANULOCYTES NFR BLD AUTO: 0.6 % — SIGNIFICANT CHANGE UP (ref 0–1.5)
KETONES UR-MCNC: NEGATIVE — SIGNIFICANT CHANGE UP
LEUKOCYTE ESTERASE UR-ACNC: NEGATIVE — SIGNIFICANT CHANGE UP
LYMPHOCYTES # BLD AUTO: 1.83 K/UL — SIGNIFICANT CHANGE UP (ref 1–3.3)
LYMPHOCYTES # BLD AUTO: 19.6 % — SIGNIFICANT CHANGE UP (ref 13–44)
MCHC RBC-ENTMCNC: 29.6 PG — SIGNIFICANT CHANGE UP (ref 27–34)
MCHC RBC-ENTMCNC: 32.5 GM/DL — SIGNIFICANT CHANGE UP (ref 32–36)
MCV RBC AUTO: 91 FL — SIGNIFICANT CHANGE UP (ref 80–100)
MONOCYTES # BLD AUTO: 0.84 K/UL — SIGNIFICANT CHANGE UP (ref 0–0.9)
MONOCYTES NFR BLD AUTO: 9 % — SIGNIFICANT CHANGE UP (ref 2–14)
NEUTROPHILS # BLD AUTO: 6.51 K/UL — SIGNIFICANT CHANGE UP (ref 1.8–7.4)
NEUTROPHILS NFR BLD AUTO: 70 % — SIGNIFICANT CHANGE UP (ref 43–77)
NITRITE UR-MCNC: NEGATIVE — SIGNIFICANT CHANGE UP
NRBC # BLD: 0 /100 WBCS — SIGNIFICANT CHANGE UP (ref 0–0)
PH UR: 6.5 — SIGNIFICANT CHANGE UP (ref 5–8)
PLATELET # BLD AUTO: 193 K/UL — SIGNIFICANT CHANGE UP (ref 150–400)
PROCALCITONIN SERPL-MCNC: 0.54 NG/ML — HIGH (ref 0.02–0.1)
PROT UR-MCNC: 100 — SIGNIFICANT CHANGE UP
RBC # BLD: 3.79 M/UL — LOW (ref 3.8–5.2)
RBC # FLD: 14.1 % — SIGNIFICANT CHANGE UP (ref 10.3–14.5)
RBC CASTS # UR COMP ASSIST: 13 /HPF — HIGH (ref 0–4)
SARS-COV-2 IGG SERPL QL IA: NEGATIVE — SIGNIFICANT CHANGE UP
SARS-COV-2 IGM SERPL IA-ACNC: 0.1 INDEX — SIGNIFICANT CHANGE UP
SP GR SPEC: >1.05 (ref 1.01–1.02)
UROBILINOGEN FLD QL: NEGATIVE — SIGNIFICANT CHANGE UP
WBC # BLD: 9.32 K/UL — SIGNIFICANT CHANGE UP (ref 3.8–10.5)
WBC # FLD AUTO: 9.32 K/UL — SIGNIFICANT CHANGE UP (ref 3.8–10.5)
WBC UR QL: 3 /HPF — SIGNIFICANT CHANGE UP (ref 0–5)

## 2020-07-03 PROCEDURE — 12345: CPT | Mod: NC,GC

## 2020-07-03 PROCEDURE — 71275 CT ANGIOGRAPHY CHEST: CPT | Mod: 26

## 2020-07-03 PROCEDURE — 99223 1ST HOSP IP/OBS HIGH 75: CPT

## 2020-07-03 RX ORDER — ENOXAPARIN SODIUM 100 MG/ML
40 INJECTION SUBCUTANEOUS DAILY
Refills: 0 | Status: DISCONTINUED | OUTPATIENT
Start: 2020-07-03 | End: 2020-07-03

## 2020-07-03 RX ORDER — SIMVASTATIN 20 MG/1
40 TABLET, FILM COATED ORAL AT BEDTIME
Refills: 0 | Status: DISCONTINUED | OUTPATIENT
Start: 2020-07-03 | End: 2020-07-03

## 2020-07-03 RX ORDER — GLUCAGON INJECTION, SOLUTION 0.5 MG/.1ML
1 INJECTION, SOLUTION SUBCUTANEOUS ONCE
Refills: 0 | Status: DISCONTINUED | OUTPATIENT
Start: 2020-07-03 | End: 2020-07-08

## 2020-07-03 RX ORDER — ACETAMINOPHEN 500 MG
650 TABLET ORAL ONCE
Refills: 0 | Status: COMPLETED | OUTPATIENT
Start: 2020-07-03 | End: 2020-07-03

## 2020-07-03 RX ORDER — INSULIN LISPRO 100/ML
VIAL (ML) SUBCUTANEOUS AT BEDTIME
Refills: 0 | Status: DISCONTINUED | OUTPATIENT
Start: 2020-07-03 | End: 2020-07-08

## 2020-07-03 RX ORDER — PIPERACILLIN AND TAZOBACTAM 4; .5 G/20ML; G/20ML
3.38 INJECTION, POWDER, LYOPHILIZED, FOR SOLUTION INTRAVENOUS ONCE
Refills: 0 | Status: COMPLETED | OUTPATIENT
Start: 2020-07-03 | End: 2020-07-03

## 2020-07-03 RX ORDER — POLYETHYLENE GLYCOL 3350 17 G/17G
17 POWDER, FOR SOLUTION ORAL
Qty: 0 | Refills: 0 | DISCHARGE

## 2020-07-03 RX ORDER — LATANOPROST 0.05 MG/ML
1 SOLUTION/ DROPS OPHTHALMIC; TOPICAL
Qty: 0 | Refills: 0 | DISCHARGE

## 2020-07-03 RX ORDER — SODIUM CHLORIDE 9 MG/ML
1000 INJECTION, SOLUTION INTRAVENOUS
Refills: 0 | Status: DISCONTINUED | OUTPATIENT
Start: 2020-07-03 | End: 2020-07-05

## 2020-07-03 RX ORDER — VANCOMYCIN HCL 1 G
1000 VIAL (EA) INTRAVENOUS EVERY 24 HOURS
Refills: 0 | Status: DISCONTINUED | OUTPATIENT
Start: 2020-07-03 | End: 2020-07-05

## 2020-07-03 RX ORDER — MEMANTINE HYDROCHLORIDE 10 MG/1
10 TABLET ORAL
Refills: 0 | Status: DISCONTINUED | OUTPATIENT
Start: 2020-07-03 | End: 2020-07-08

## 2020-07-03 RX ORDER — DEXTROSE 50 % IN WATER 50 %
12.5 SYRINGE (ML) INTRAVENOUS ONCE
Refills: 0 | Status: DISCONTINUED | OUTPATIENT
Start: 2020-07-03 | End: 2020-07-08

## 2020-07-03 RX ORDER — DONEPEZIL HYDROCHLORIDE 10 MG/1
1 TABLET, FILM COATED ORAL
Qty: 0 | Refills: 0 | DISCHARGE

## 2020-07-03 RX ORDER — DONEPEZIL HYDROCHLORIDE 10 MG/1
10 TABLET, FILM COATED ORAL AT BEDTIME
Refills: 0 | Status: DISCONTINUED | OUTPATIENT
Start: 2020-07-03 | End: 2020-07-03

## 2020-07-03 RX ORDER — DEXTROSE 50 % IN WATER 50 %
25 SYRINGE (ML) INTRAVENOUS ONCE
Refills: 0 | Status: DISCONTINUED | OUTPATIENT
Start: 2020-07-03 | End: 2020-07-08

## 2020-07-03 RX ORDER — SODIUM CHLORIDE 9 MG/ML
1000 INJECTION, SOLUTION INTRAVENOUS
Refills: 0 | Status: DISCONTINUED | OUTPATIENT
Start: 2020-07-03 | End: 2020-07-03

## 2020-07-03 RX ORDER — ACETAMINOPHEN 500 MG
650 TABLET ORAL EVERY 6 HOURS
Refills: 0 | Status: COMPLETED | OUTPATIENT
Start: 2020-07-03 | End: 2020-07-03

## 2020-07-03 RX ORDER — PIPERACILLIN AND TAZOBACTAM 4; .5 G/20ML; G/20ML
3.38 INJECTION, POWDER, LYOPHILIZED, FOR SOLUTION INTRAVENOUS EVERY 8 HOURS
Refills: 0 | Status: DISCONTINUED | OUTPATIENT
Start: 2020-07-03 | End: 2020-07-05

## 2020-07-03 RX ORDER — INSULIN LISPRO 100/ML
VIAL (ML) SUBCUTANEOUS
Refills: 0 | Status: DISCONTINUED | OUTPATIENT
Start: 2020-07-03 | End: 2020-07-08

## 2020-07-03 RX ORDER — DEXTROSE 50 % IN WATER 50 %
15 SYRINGE (ML) INTRAVENOUS ONCE
Refills: 0 | Status: DISCONTINUED | OUTPATIENT
Start: 2020-07-03 | End: 2020-07-08

## 2020-07-03 RX ORDER — ACETAMINOPHEN 500 MG
1000 TABLET ORAL ONCE
Refills: 0 | Status: DISCONTINUED | OUTPATIENT
Start: 2020-07-03 | End: 2020-07-03

## 2020-07-03 RX ORDER — ENOXAPARIN SODIUM 100 MG/ML
30 INJECTION SUBCUTANEOUS DAILY
Refills: 0 | Status: DISCONTINUED | OUTPATIENT
Start: 2020-07-03 | End: 2020-07-08

## 2020-07-03 RX ORDER — SODIUM CHLORIDE 9 MG/ML
1000 INJECTION INTRAMUSCULAR; INTRAVENOUS; SUBCUTANEOUS
Refills: 0 | Status: DISCONTINUED | OUTPATIENT
Start: 2020-07-03 | End: 2020-07-03

## 2020-07-03 RX ORDER — SODIUM CHLORIDE 9 MG/ML
1000 INJECTION, SOLUTION INTRAVENOUS
Refills: 0 | Status: DISCONTINUED | OUTPATIENT
Start: 2020-07-03 | End: 2020-07-08

## 2020-07-03 RX ADMIN — SODIUM CHLORIDE 125 MILLILITER(S): 9 INJECTION, SOLUTION INTRAVENOUS at 20:32

## 2020-07-03 RX ADMIN — PIPERACILLIN AND TAZOBACTAM 200 GRAM(S): 4; .5 INJECTION, POWDER, LYOPHILIZED, FOR SOLUTION INTRAVENOUS at 03:47

## 2020-07-03 RX ADMIN — Medication 250 MILLIGRAM(S): at 05:17

## 2020-07-03 RX ADMIN — Medication 1: at 17:36

## 2020-07-03 RX ADMIN — PIPERACILLIN AND TAZOBACTAM 25 GRAM(S): 4; .5 INJECTION, POWDER, LYOPHILIZED, FOR SOLUTION INTRAVENOUS at 11:06

## 2020-07-03 RX ADMIN — Medication 260 MILLIGRAM(S): at 03:43

## 2020-07-03 RX ADMIN — PIPERACILLIN AND TAZOBACTAM 25 GRAM(S): 4; .5 INJECTION, POWDER, LYOPHILIZED, FOR SOLUTION INTRAVENOUS at 18:57

## 2020-07-03 RX ADMIN — SODIUM CHLORIDE 75 MILLILITER(S): 9 INJECTION, SOLUTION INTRAVENOUS at 11:09

## 2020-07-03 RX ADMIN — ENOXAPARIN SODIUM 30 MILLIGRAM(S): 100 INJECTION SUBCUTANEOUS at 14:23

## 2020-07-03 RX ADMIN — Medication 650 MILLIGRAM(S): at 22:31

## 2020-07-03 RX ADMIN — MEMANTINE HYDROCHLORIDE 10 MILLIGRAM(S): 10 TABLET ORAL at 17:37

## 2020-07-03 RX ADMIN — Medication 260 MILLIGRAM(S): at 14:05

## 2020-07-03 RX ADMIN — SODIUM CHLORIDE 75 MILLILITER(S): 9 INJECTION INTRAMUSCULAR; INTRAVENOUS; SUBCUTANEOUS at 03:43

## 2020-07-03 NOTE — PROGRESS NOTE ADULT - SUBJECTIVE AND OBJECTIVE BOX
Contact Information:  Jose Juan Ramos II, MD, MPH  PGY-1, Internal Medicine  Pager: [phone] ([facility]) /// [phone] ([facility])    AUGUST DANIELS, MRN-[id number]    Patient is a 89y old  Female who presents with a chief complaint of weakness, fatigue (03 Jul 2020 03:16)      OVERNIGHT EVENTS: Patient admitted for weakness and fatigue. On admission, ROS limited as patient hard of hearing and speaks Chinese (unable to establish clear communication with patient even with interpretive services). Collateral obtained from nephew Jaime Duane, states that patient was last in previous state of health 2 days prior to admission, able to ambulate and perform activities of daily living such as eating and using restroom. Nephew notes that on day before presentation, patient was noted to be more lethargic per his words, unable to ambulate as easily with decreased ability to urinate; he also notes that the patient was observed to be chewing and spitting food out instead of eating. He denies observing the following: fever, lightheadedness, dizziness, diarrhea, SOB, cough, N/V. He also denies observing fall, trauma, heavy lifting, or vigorous physical exertion.    SUBJECTIVE: Patient evaluated at bedside; attempts to communicate with patient using interpretive services (Chinese Mandarin/Cantonese  #[id number] unsuccessful because patient only responding with monosyllabic utterings incomprehensive to .     ROS limited because of patient mental status.    OBJECTIVE:  Vital Signs Last 24 Hrs  T(C): 39.7 (03 Jul 2020 02:57), Max: 39.7 (03 Jul 2020 02:57)  T(F): 103.4 (03 Jul 2020 02:57), Max: 103.4 (03 Jul 2020 02:57)  HR: 79 (03 Jul 2020 01:53) (71 - 86)  BP: 131/79 (03 Jul 2020 01:53) (105/61 - 147/82)  BP(mean): --  RR: 18 (03 Jul 2020 01:53) (18 - 20)  SpO2: 95% (03 Jul 2020 01:53) (95% - 97%)  I&O's Summary      MEDICATIONS  (STANDING):  dextrose 5%. 1000 milliLiter(s) (50 mL/Hr) IV Continuous <Continuous>  dextrose 50% Injectable 12.5 Gram(s) IV Push once  dextrose 50% Injectable 25 Gram(s) IV Push once  dextrose 50% Injectable 25 Gram(s) IV Push once  donepezil 10 milliGRAM(s) Oral at bedtime  enoxaparin Injectable 30 milliGRAM(s) SubCutaneous daily  insulin lispro (HumaLOG) corrective regimen sliding scale   SubCutaneous three times a day before meals  insulin lispro (HumaLOG) corrective regimen sliding scale   SubCutaneous at bedtime  lactated ringers. 1000 milliLiter(s) (75 mL/Hr) IV Continuous <Continuous>  memantine 10 milliGRAM(s) Oral two times a day  piperacillin/tazobactam IVPB.. 3.375 Gram(s) IV Intermittent every 8 hours  vancomycin  IVPB 1000 milliGRAM(s) IV Intermittent every 24 hours    MEDICATIONS  (PRN):  dextrose 40% Gel 15 Gram(s) Oral once PRN Blood Glucose LESS THAN 70 milliGRAM(s)/deciliter  glucagon  Injectable 1 milliGRAM(s) IntraMuscular once PRN Glucose LESS THAN 70 milligrams/deciliter    Allergies    No Known Allergies    Intolerances        CONSTITUTIONAL: Awake, somewhat alert but not maintaining complete focus on examiner in the room.  EYES: No scleral icterus. No conjunctival injection.  ENT: Moist oral mucosa. No erythema. No pharyngeal exudates.   RESPIRATORY: Tachypneic to approximately 28. CTAB. No wheezes, rales, or rhonchi. No accessory muscle use.  CARDIOVASCULAR: +S1/S2. No audible S3/S4. Regular rate and rhythm.  GASTROINTESTINAL: Soft, nondistended. +BS. No elicitation of grimacing or discomfort on superficial and deep ABD palpation.  EXTREMITY: No LE swelling or edema. EXTs warm to touch.  MUSCULOSKELETAL: Occasional spontaneous and nonpurposeful movements of EXTs x 4.  NEUROLOGICAL: Unable to assess due to mental status.                            11.2   9.32  )-----------( 193      ( 03 Jul 2020 05:02 )             34.5       07-03    133<L>  |  97  |  14  ----------------------------<  139<H>  4.2   |  21<L>  |  0.55    Ca    8.4      03 Jul 2020 04:36  Phos  2.8     07-03  Mg     1.9     07-03    TPro  7.0  /  Alb  3.6  /  TBili  0.5  /  DBili  x   /  AST  279<H>  /  ALT  124<H>  /  AlkPhos  101  07-03    CAPILLARY BLOOD GLUCOSE      POCT Blood Glucose.: 131 mg/dL (02 Jul 2020 20:09)    LIVER FUNCTIONS - ( 03 Jul 2020 04:36 )  Alb: 3.6 g/dL / Pro: 7.0 g/dL / ALK PHOS: 101 U/L / ALT: 124 U/L / AST: 279 U/L / GGT: x           CARDIAC MARKERS ( 03 Jul 2020 04:36 )  x     / x     / 28175 U/L / x     / x          Urinalysis Basic - ( 02 Jul 2020 23:47 )    Color: Yellow / Appearance: Clear / SG: >1.050 / pH: x  Gluc: x / Ketone: Negative  / Bili: Negative / Urobili: Negative   Blood: x / Protein: 100 / Nitrite: Negative   Leuk Esterase: Negative / RBC: 13 /hpf / WBC 3 /HPF   Sq Epi: x / Non Sq Epi: 6 /hpf / Bacteria: Negative            RADIOLOGY AND ADDITIONAL TESTS:    CONSULTANT NOTES REVIEWED:    CARE DISCUSSED WITH THE FOLLOWING CONSULTANTS/PROVIDERS:

## 2020-07-03 NOTE — H&P ADULT - NSHPPHYSICALEXAM_GEN_ALL_CORE
Vital Signs Last 24 Hrs  T(C): 39.7 (03 Jul 2020 02:57), Max: 39.7 (03 Jul 2020 02:57)  T(F): 103.4 (03 Jul 2020 02:57), Max: 103.4 (03 Jul 2020 02:57)  HR: 79 (03 Jul 2020 01:53) (71 - 86)  BP: 131/79 (03 Jul 2020 01:53) (105/61 - 147/82)  BP(mean): --  RR: 18 (03 Jul 2020 01:53) (18 - 20)  SpO2: 95% (03 Jul 2020 01:53) (95% - 97%)    PHYSICAL EXAM:  GENERAL: NAD, thin, elderly woman, rigoring   HEAD:  Atraumatic, normocephalic  EYES: EOMI, conjunctiva and sclera clear  NECK: Supple, dry MM   CHEST/LUNG: Clear to auscultation bilaterally; no wheezing or rales  HEART: Regular rate and rhythm; no murmurs  ABDOMEN: Soft, nondistended; bowel sounds present  EXTREMITIES:  2+ Peripheral Pulses, no edema  PSYCH: calm and cooperative   NEUROLOGY: unable to ascertain orientation, but is alert and awake, spontaneously moving all extremities   SKIN: No rashes or lesions  MUSCULOSKELETAL: moving all extremities

## 2020-07-03 NOTE — H&P ADULT - PROBLEM SELECTOR PLAN 4
pt appears clinically dehydrated, urine showing high sp gravity   s/p 1L in ED   c/w maintenance IVF

## 2020-07-03 NOTE — PROGRESS NOTE ADULT - PROBLEM SELECTOR PLAN 4
pt with isolated AST/ALT elevation 275/126, normal Tbili/AlkP. No tenderness elicited on abdominal exam, CT A/P with no GB/liver pathology noted; possibly 2/2 sepsis  check RUQ-US  check hep panel   trend LFTs - Noted AST/ALT 270s/120s with NML total bilirubin/Alk phos   - No tenderness elicited on abdominal exam  - CT A/P with no GB/liver pathology noted  - Etiology possibly 2/2 combination of sepsis and rhabdomyolysis  - F/u hepatitides panel, lipid panel  - If transaminitis continues to worsen, will obtain RUQ US

## 2020-07-03 NOTE — H&P ADULT - PROBLEM SELECTOR PLAN 3
pt with isolated AST/ALT elevation 275/126, normal Tbili/AlkP. No tenderness elicited on abdominal exam, CT A/P with no GB/liver pathology noted; possibly 2/2 sepsis  check RUQ-US  check hep panel   trend LFTs

## 2020-07-03 NOTE — H&P ADULT - HISTORY OF PRESENT ILLNESS
89F with PMH of HTN, T2DM, dementia (unclear baseline), [place] BIBEMS for weakness/fatigue. Pt is [place] and possible language barrier - tried to speak with pt via Mandarin/Cantonese  ([place] ID#[id number]) but  unable to understand pt; unable to obtain history from pt. Multiple calls made to emergency contacts in chart, unable to reach family, VMs left.     From chart review EMS called by pt's son for weakness and fatigue. Pt normally ambulatory, but now requiring assistance 2/2 weakness. Also with poor PO intake. Son also noted progressive Alzheimer's dementia.

## 2020-07-03 NOTE — H&P ADULT - NSHPLABSRESULTS_GEN_ALL_CORE
Labs, imaging and EKG personally reviewed and interpreted by me.                           12.1   9.29  )-----------( 190      ( 02 Jul 2020 19:54 )             36.5     07-02    130<L>  |  96  |  16  ----------------------------<  152<H>  4.2   |  20<L>  |  0.59    Ca    8.8      02 Jul 2020 19:54    TPro  7.1  /  Alb  3.6  /  TBili  0.5  /  DBili  x   /  AST  275<H>  /  ALT  126<H>  /  AlkPhos  113  07-02      Urinalysis Basic - ( 02 Jul 2020 23:47 )  Color: Yellow / Appearance: Clear / SG: >1.050 / pH: x  Gluc: x / Ketone: Negative  / Bili: Negative / Urobili: Negative   Blood: x / Protein: 100 / Nitrite: Negative   Leuk Esterase: Negative / RBC: 13 /hpf / WBC 3 /HPF   Sq Epi: x / Non Sq Epi: 6 /hpf / Bacteria: Negative      < from: Xray Chest 1 View- PORTABLE-Urgent (07.02.20 @ 20:43) >  ******PRELIMINARY REPORT******        INTERPRETATION:  Clear lungs.      < from: CT Abdomen and Pelvis w/ IV Cont (07.02.20 @ 21:50) >    FINDINGS:  LOWER CHEST: Within normal limits.    LIVER: Within normal limits.  BILE DUCTS: Normal caliber.  GALLBLADDER: Within normal limits.  SPLEEN: Within normal limits.  PANCREAS: Within normal limits.  ADRENALS: Within normal limits.  KIDNEYS/URETERS: Within normal limits.    BLADDER: Within normal limits.  REPRODUCTIVE ORGANS: Calcified fibroids.    BOWEL: No bowel obstruction. Appendix is normal. Sigmoid diverticulosis.  PERITONEUM: No ascites.  VESSELS: Atherosclerotic changes. Severe calcifications of the right common femoral artery.  RETROPERITONEUM/LYMPH NODES: No lymphadenopathy.    ABDOMINAL WALL: Within normal limits.  BONES: Degenerative changes. Dextroscoliosis of the lumbar spine. Straightening of the lumbar lordosis.    IMPRESSION:   Etiology for patient's abdominal pain is not elucidated in this study.    < end of copied text > Labs, imaging and EKG personally reviewed and interpreted by me. EKG sinus 80s,no acute ischemic changes,                            12.1   9.29  )-----------( 190      ( 02 Jul 2020 19:54 )             36.5     07-02    130<L>  |  96  |  16  ----------------------------<  152<H>  4.2   |  20<L>  |  0.59    Ca    8.8      02 Jul 2020 19:54    TPro  7.1  /  Alb  3.6  /  TBili  0.5  /  DBili  x   /  AST  275<H>  /  ALT  126<H>  /  AlkPhos  113  07-02      Urinalysis Basic - ( 02 Jul 2020 23:47 )  Color: Yellow / Appearance: Clear / SG: >1.050 / pH: x  Gluc: x / Ketone: Negative  / Bili: Negative / Urobili: Negative   Blood: x / Protein: 100 / Nitrite: Negative   Leuk Esterase: Negative / RBC: 13 /hpf / WBC 3 /HPF   Sq Epi: x / Non Sq Epi: 6 /hpf / Bacteria: Negative      < from: Xray Chest 1 View- PORTABLE-Urgent (07.02.20 @ 20:43) >  ******PRELIMINARY REPORT******        INTERPRETATION:  Clear lungs.      < from: CT Abdomen and Pelvis w/ IV Cont (07.02.20 @ 21:50) >    FINDINGS:  LOWER CHEST: Within normal limits.    LIVER: Within normal limits.  BILE DUCTS: Normal caliber.  GALLBLADDER: Within normal limits.  SPLEEN: Within normal limits.  PANCREAS: Within normal limits.  ADRENALS: Within normal limits.  KIDNEYS/URETERS: Within normal limits.    BLADDER: Within normal limits.  REPRODUCTIVE ORGANS: Calcified fibroids.    BOWEL: No bowel obstruction. Appendix is normal. Sigmoid diverticulosis.  PERITONEUM: No ascites.  VESSELS: Atherosclerotic changes. Severe calcifications of the right common femoral artery.  RETROPERITONEUM/LYMPH NODES: No lymphadenopathy.    ABDOMINAL WALL: Within normal limits.  BONES: Degenerative changes. Dextroscoliosis of the lumbar spine. Straightening of the lumbar lordosis.    IMPRESSION:   Etiology for patient's abdominal pain is not elucidated in this study.    < end of copied text >

## 2020-07-03 NOTE — PROGRESS NOTE ADULT - PROBLEM SELECTOR PLAN 3
- CK 10 - [place] [address]; UA demonstrating moderate blood with 13-14 RBCs  - Unclear etiology as per HPI from nephew negative for falls, trauma or strenuous exertion; possibly due to infectious status; per outpatient medication review, it is noted that patient does take statin  - Statin held in context of rhabdo  - Fluid resuscitation x 24 hours  - Trend CMP  - Trend CK

## 2020-07-03 NOTE — PROGRESS NOTE ADULT - PROBLEM SELECTOR PLAN 2
- On admissions, bandemia 10.4% + fever without clear source, unable to elicit potential source from patient given mental status  - UA (-), CXR (-), CT A/P without acute pathology; COVID (-)   - Pending CTA chest for further assessment  - Empirically treat with vanco/zosyn for now, monitor vanco levels   - F/u Bcx, Ucx  - Though COVID-negative, will recheck COVID PCR given systemic symptoms without clear source; will recheck D-dimer/ESR/CRP/Procal  - Monitor vital signs for fever  - Tylenol PRN - On admissions, bandemia 10.4% + recurrent fever without clear source, unable to elicit potential source from patient given mental status  - UA (-), CXR (-), CT A/P without acute pathology; COVID (-)   - Pending CTA chest for further assessment  - Empirically treat with vanco/zosyn for now, monitor vanco levels   - F/u Bcx, Ucx  - Though COVID-negative, will recheck COVID PCR given systemic symptoms without clear source; will recheck D-dimer/ESR/CRP/Procal  - Monitor vital signs for fever  - Tylenol PRN

## 2020-07-03 NOTE — CHART NOTE - NSCHARTNOTEFT_GEN_A_CORE
D-dimer, CK and CRP elevated with negative procalcitonin in pt with fever and bandemia without clear source - despite negative COVID swab, concern remains for possible COVID infection. Will check CTA chest to eval further and r/o PE, c/w IVF for now and trend CK. Will place on isolation precautions for now, pt to remain on 5M and will reswab for COVID.    Signed out to medicine team.

## 2020-07-03 NOTE — PROGRESS NOTE ADULT - PROBLEM SELECTOR PLAN 6
pt appears clinically dehydrated, urine showing high sp gravity   s/p 1L in ED   c/w maintenance IVF - Alzheimer's type. On admission, patient minimally communicative, likely combination of being hard of hearing, with foreign tongue (Chinese-speaking), with dementia  - Per family, patient is ambulatory at baseline and usually aware of self, occasionally aware of place and time  - C/w memantine and donepezil

## 2020-07-03 NOTE — PROGRESS NOTE ADULT - PROBLEM SELECTOR PLAN 7
check A1c   hold home glipizide   low sliding scale and FS ac and hs - History of DM on glipizide XL 5 mg daily at home  - ISS  - Monitor finger sticks with goal 100-180   - F/u A1c

## 2020-07-03 NOTE — PROGRESS NOTE ADULT - PROBLEM SELECTOR PLAN 5
hypoNa 130 - likely 2/2 poor PO intake as per hx  s/p 1L IVF, c/w maintenance   check urine lytes   recheck BMP in AM - Na 130 on admission, like 2/2 poor PO intake  - s/p 1L IVF with noted improvement to Na 133   - C/w maintenance fluids  - Trend BMP

## 2020-07-03 NOTE — H&P ADULT - PROBLEM SELECTOR PLAN 2
hypoNa 130 - likely 2/2 poor PO intake as per hx  s/p 1L IVF, c/w maintenance   check urine lytes   recheck BMP in AM

## 2020-07-03 NOTE — PROGRESS NOTE ADULT - PROBLEM SELECTOR PLAN 1
- Presents w/ 1 day of weakness and fatigue, previously ambulatory and more active  - Ddx quite broad - infectious vs neurological (stroke) vs metabolic vs iatrogenic vs idiopathic  - Patient noted to have bandemia > 10% with acute fever 103.4. CTH negative; labs notable for rhabdomyolysis, otherwise electrolytes WNL; low suspicion for iatrogenic causes given current home meds not known to cause.  - - Presents w/ 1 day of weakness and fatigue, previously ambulatory and more active  - Ddx quite broad - infectious vs neurological (stroke) vs metabolic vs iatrogenic vs idiopathic  - Patient noted to have bandemia > 10% with acute fever 103.4. CTH negative; labs notable for rhabdomyolysis, otherwise electrolytes WNL; low suspicion for iatrogenic causes given current home meds not known to cause  - Will check TSH, vitamin levels  - Will f/u infectious w/u  - Will trend CMP  - PT consult pending - Presents w/ 1 day of weakness and fatigue, previously ambulatory and more active  - Ddx quite broad - infectious vs neurological (stroke) vs metabolic vs iatrogenic vs idiopathic  - Patient noted to have bandemia > 10% with acute fever 103.4. CTH negative; labs notable for rhabdomyolysis, otherwise electrolytes WNL; low suspicion for iatrogenic causes given current home meds not known to cause patient's constellation of symptoms  - Will check TSH, vitamin levels  - Will f/u infectious w/u  - Will trend CMP  - PT consult pending

## 2020-07-03 NOTE — H&P ADULT - ASSESSMENT
89F with PMH of HTN, T2DM, dementia (unclear baseline), [place] BIBEMS for weakness/fatigue, found to be septic, unclear source.

## 2020-07-03 NOTE — PROGRESS NOTE ADULT - PROBLEM SELECTOR PLAN 8
BP thus far stable, but given sepsis will hold losartan for now   vitals q4h   resume as appropriate - On admission -147, currently 131   - Takes losartan 50 mg at home  - Will hold given infectious clinical picture, will resume as appropriate

## 2020-07-03 NOTE — PROGRESS NOTE ADULT - ASSESSMENT
89F with PMH of HTN, T2DM, dementia (unclear baseline), [place] BIBEMS for weakness/fatigue, found to be septic with unclear source, with laboratory evidence of rhabdomyolysis.

## 2020-07-03 NOTE — PROGRESS NOTE ADULT - PROBLEM SELECTOR PLAN 10
1.  Name of PCP:  2.  PCP Contacted on Admission: [ ] Y    [ ] N    3.  PCP contacted at Discharge: [ ] Y    [ ] N    [ ] N/A  4.  Post-Discharge Appointment Date and Location:  5.  Summary of Handoff given to PCP: 1.  Name of PCP:  2.  PCP Contacted on Admission: [ ] Y    [ x ] N    3.  PCP contacted at Discharge: [ ] Y    [ ] N    [ ] N/A  4.  Post-Discharge Appointment Date and Location:  5.  Summary of Handoff given to PCP:

## 2020-07-03 NOTE — PROGRESS NOTE ADULT - ATTENDING COMMENTS
Pt presented with weakness, meeting SIRS criteria Pt presented with weakness, meeting SIRS criteria. No focal source of infection localized. Additionally, found to have rhabdo.   Will continue broad spectrum abx for now. CT chest pending, repeat COVID testing pending although doubt infection.   c/w IVF for Rhabdo. ?Medication induced as she is on a statin vs infectious. Otherwise no trauma, crush injury, strenuous exercise   Could check viral studies to r/o viral etiology as cause of fever and rhabdo should cultures and imaging be negative Pt presented with weakness, meeting SIRS criteria. No focal source of infection localized. Additionally, found to have rhabdo. Constellation of symptoms could be 2/2 medication side effect from Aricept (Has been linked to rhabdo and NMS)  Will continue broad spectrum abx for now. CT chest pending, repeat COVID testing pending although doubt infection.   ?Medication induced as she is on a statin as well as Aricept. Otherwise no trauma, crush injury, strenuous exercise   Will treat with supportive care for now for rhabdo and potential NMS. D/c statin and Aricept. c/w IVF  Could check viral studies to r/o viral etiology as cause of fever and rhabdo should cultures and imaging be negative and fevers do not resolve with removal of aricept

## 2020-07-03 NOTE — H&P ADULT - PROBLEM SELECTOR PLAN 1
pt with fever 103 and bandemia of 10%, history limited and unable to identify localizing signs/sx   UA (-), CXR (-), CT A/P without acute pathology; COVID (-)   check CT chest   empirically treat with vanco/zosyn for now, monitor vanco levels   check blood and urine cultures  check d-dimer/ESR/CRP/procal  trend fever curve, tylenol prn   lactate 2.0 on admission, no need to further trend

## 2020-07-04 LAB
A1C WITH ESTIMATED AVERAGE GLUCOSE RESULT: 6.6 % — HIGH (ref 4–5.6)
ALBUMIN SERPL ELPH-MCNC: 3 G/DL — LOW (ref 3.3–5)
ALP SERPL-CCNC: 82 U/L — SIGNIFICANT CHANGE UP (ref 40–120)
ALT FLD-CCNC: 111 U/L — HIGH (ref 10–45)
ANION GAP SERPL CALC-SCNC: 12 MMOL/L — SIGNIFICANT CHANGE UP (ref 5–17)
AST SERPL-CCNC: 239 U/L — HIGH (ref 10–40)
BASOPHILS # BLD AUTO: 0.03 K/UL — SIGNIFICANT CHANGE UP (ref 0–0.2)
BASOPHILS NFR BLD AUTO: 0.4 % — SIGNIFICANT CHANGE UP (ref 0–2)
BILIRUB SERPL-MCNC: 0.4 MG/DL — SIGNIFICANT CHANGE UP (ref 0.2–1.2)
BUN SERPL-MCNC: 16 MG/DL — SIGNIFICANT CHANGE UP (ref 7–23)
CALCIUM SERPL-MCNC: 8 MG/DL — LOW (ref 8.4–10.5)
CHLORIDE SERPL-SCNC: 98 MMOL/L — SIGNIFICANT CHANGE UP (ref 96–108)
CHOLEST SERPL-MCNC: 103 MG/DL — SIGNIFICANT CHANGE UP (ref 10–199)
CK SERPL-CCNC: 6806 U/L — HIGH (ref 25–170)
CO2 SERPL-SCNC: 22 MMOL/L — SIGNIFICANT CHANGE UP (ref 22–31)
CREAT SERPL-MCNC: 0.63 MG/DL — SIGNIFICANT CHANGE UP (ref 0.5–1.3)
CRP SERPL-MCNC: 8.08 MG/DL — HIGH (ref 0–0.4)
CULTURE RESULTS: SIGNIFICANT CHANGE UP
D DIMER BLD IA.RAPID-MCNC: 1004 NG/ML DDU — HIGH
EOSINOPHIL # BLD AUTO: 0.01 K/UL — SIGNIFICANT CHANGE UP (ref 0–0.5)
EOSINOPHIL NFR BLD AUTO: 0.1 % — SIGNIFICANT CHANGE UP (ref 0–6)
ESTIMATED AVERAGE GLUCOSE: 143 MG/DL — HIGH (ref 68–114)
FERRITIN SERPL-MCNC: 1307 NG/ML — HIGH (ref 15–150)
FOLATE SERPL-MCNC: 17.8 NG/ML — SIGNIFICANT CHANGE UP
GLUCOSE SERPL-MCNC: 138 MG/DL — HIGH (ref 70–99)
HAV IGM SER-ACNC: SIGNIFICANT CHANGE UP
HBV CORE IGM SER-ACNC: SIGNIFICANT CHANGE UP
HBV SURFACE AB SER-ACNC: REACTIVE
HBV SURFACE AG SER-ACNC: SIGNIFICANT CHANGE UP
HCT VFR BLD CALC: 33.5 % — LOW (ref 34.5–45)
HCV AB S/CO SERPL IA: 0.27 S/CO — SIGNIFICANT CHANGE UP (ref 0–0.99)
HCV AB SERPL-IMP: SIGNIFICANT CHANGE UP
HDLC SERPL-MCNC: 29 MG/DL — LOW
HGB BLD-MCNC: 10.7 G/DL — LOW (ref 11.5–15.5)
HIV 1+2 AB+HIV1 P24 AG SERPL QL IA: SIGNIFICANT CHANGE UP
IMM GRANULOCYTES NFR BLD AUTO: 0.9 % — SIGNIFICANT CHANGE UP (ref 0–1.5)
LIPID PNL WITH DIRECT LDL SERPL: 46 MG/DL — SIGNIFICANT CHANGE UP
LYMPHOCYTES # BLD AUTO: 1.53 K/UL — SIGNIFICANT CHANGE UP (ref 1–3.3)
LYMPHOCYTES # BLD AUTO: 17.9 % — SIGNIFICANT CHANGE UP (ref 13–44)
MAGNESIUM SERPL-MCNC: 1.9 MG/DL — SIGNIFICANT CHANGE UP (ref 1.6–2.6)
MCHC RBC-ENTMCNC: 29.6 PG — SIGNIFICANT CHANGE UP (ref 27–34)
MCHC RBC-ENTMCNC: 31.9 GM/DL — LOW (ref 32–36)
MCV RBC AUTO: 92.5 FL — SIGNIFICANT CHANGE UP (ref 80–100)
MONOCYTES # BLD AUTO: 0.72 K/UL — SIGNIFICANT CHANGE UP (ref 0–0.9)
MONOCYTES NFR BLD AUTO: 8.4 % — SIGNIFICANT CHANGE UP (ref 2–14)
NEUTROPHILS # BLD AUTO: 6.18 K/UL — SIGNIFICANT CHANGE UP (ref 1.8–7.4)
NEUTROPHILS NFR BLD AUTO: 72.3 % — SIGNIFICANT CHANGE UP (ref 43–77)
NRBC # BLD: 0 /100 WBCS — SIGNIFICANT CHANGE UP (ref 0–0)
PHOSPHATE SERPL-MCNC: 2.4 MG/DL — LOW (ref 2.5–4.5)
PLATELET # BLD AUTO: 180 K/UL — SIGNIFICANT CHANGE UP (ref 150–400)
POTASSIUM SERPL-MCNC: 3.8 MMOL/L — SIGNIFICANT CHANGE UP (ref 3.5–5.3)
POTASSIUM SERPL-SCNC: 3.8 MMOL/L — SIGNIFICANT CHANGE UP (ref 3.5–5.3)
PROT SERPL-MCNC: 6.1 G/DL — SIGNIFICANT CHANGE UP (ref 6–8.3)
RAPID RVP RESULT: SIGNIFICANT CHANGE UP
RBC # BLD: 3.62 M/UL — LOW (ref 3.8–5.2)
RBC # FLD: 14.5 % — SIGNIFICANT CHANGE UP (ref 10.3–14.5)
SARS-COV-2 RNA SPEC QL NAA+PROBE: SIGNIFICANT CHANGE UP
SODIUM SERPL-SCNC: 132 MMOL/L — LOW (ref 135–145)
SPECIMEN SOURCE: SIGNIFICANT CHANGE UP
TOTAL CHOLESTEROL/HDL RATIO MEASUREMENT: 3.5 RATIO — SIGNIFICANT CHANGE UP (ref 3.3–7.1)
TRIGL SERPL-MCNC: 138 MG/DL — SIGNIFICANT CHANGE UP (ref 10–149)
TSH SERPL-MCNC: 3.36 UIU/ML — SIGNIFICANT CHANGE UP (ref 0.27–4.2)
VIT B12 SERPL-MCNC: 688 PG/ML — SIGNIFICANT CHANGE UP (ref 232–1245)
WBC # BLD: 8.55 K/UL — SIGNIFICANT CHANGE UP (ref 3.8–10.5)
WBC # FLD AUTO: 8.55 K/UL — SIGNIFICANT CHANGE UP (ref 3.8–10.5)

## 2020-07-04 PROCEDURE — 99233 SBSQ HOSP IP/OBS HIGH 50: CPT | Mod: GC

## 2020-07-04 RX ORDER — SODIUM,POTASSIUM PHOSPHATES 278-250MG
1 POWDER IN PACKET (EA) ORAL ONCE
Refills: 0 | Status: COMPLETED | OUTPATIENT
Start: 2020-07-04 | End: 2020-07-04

## 2020-07-04 RX ORDER — SODIUM CHLORIDE 9 MG/ML
250 INJECTION INTRAMUSCULAR; INTRAVENOUS; SUBCUTANEOUS ONCE
Refills: 0 | Status: COMPLETED | OUTPATIENT
Start: 2020-07-04 | End: 2020-07-04

## 2020-07-04 RX ADMIN — SODIUM CHLORIDE 500 MILLILITER(S): 9 INJECTION INTRAMUSCULAR; INTRAVENOUS; SUBCUTANEOUS at 00:45

## 2020-07-04 RX ADMIN — Medication 250 MILLIGRAM(S): at 02:28

## 2020-07-04 RX ADMIN — ENOXAPARIN SODIUM 30 MILLIGRAM(S): 100 INJECTION SUBCUTANEOUS at 12:07

## 2020-07-04 RX ADMIN — PIPERACILLIN AND TAZOBACTAM 25 GRAM(S): 4; .5 INJECTION, POWDER, LYOPHILIZED, FOR SOLUTION INTRAVENOUS at 18:52

## 2020-07-04 RX ADMIN — MEMANTINE HYDROCHLORIDE 10 MILLIGRAM(S): 10 TABLET ORAL at 05:28

## 2020-07-04 RX ADMIN — PIPERACILLIN AND TAZOBACTAM 25 GRAM(S): 4; .5 INJECTION, POWDER, LYOPHILIZED, FOR SOLUTION INTRAVENOUS at 02:05

## 2020-07-04 RX ADMIN — Medication 1 PACKET(S): at 12:07

## 2020-07-04 RX ADMIN — PIPERACILLIN AND TAZOBACTAM 25 GRAM(S): 4; .5 INJECTION, POWDER, LYOPHILIZED, FOR SOLUTION INTRAVENOUS at 12:06

## 2020-07-04 RX ADMIN — MEMANTINE HYDROCHLORIDE 10 MILLIGRAM(S): 10 TABLET ORAL at 17:57

## 2020-07-04 NOTE — CHART NOTE - NSCHARTNOTEFT_GEN_A_CORE
Called for this patient regarding temperature of 102 rectal prior to going down for CT scan. /76. Gave tylenol suspension for fever as per sign-out; patient was off fluids while obtaining CT. Called again around 23:15 for rectal temp 103.3 and BP 92/56. Fluids had been restarted at this time (125mL/hr). On assessment, the patient was sleeping but arousable. Pt is Mandarin speaking with hx of dementia, attempted to use . Repeat blood pressure resulted similar value. Of note, the nurse reported that she had a number of soft stools recently but was unable to get a sample. Patient is on isolation precautions. Consulted the MAR who also examined the patient. Ordered 250cc bolus NS and cooling blanket per MAR's recommendation. Requested that the nurse repeat temp and blood pressure in one hour. Will continue to monitor patient's vital signs, and notify the day team accordingly. Called for this patient regarding temperature of 102 rectal prior to going down for CT scan. /76. Gave tylenol suspension for fever as per sign-out; patient was off fluids while obtaining CT. Called again around 23:15 for rectal temp 103.3 and BP 92/56. Fluids had been restarted at this time (125mL/hr). On assessment, the patient was sleeping but arousable. Pt is Mandarin speaking with hx of dementia, attempted to use . Repeat blood pressure resulted similar value. Of note, the nurse reported that she had a number of soft stools recently but was unable to get a sample. Patient is on isolation precautions. Consulted the MAR who also examined the patient. Ordered 250cc bolus NS and cooling blanket per MAR's recommendation. Requested that the nurse repeat temp and blood pressure in one hour. Vital @ 0200 T100.0, BP 99/68 HR 82, RR 18, O2 100. Will continue to monitor patient's vital signs, and notify the day team accordingly.

## 2020-07-04 NOTE — PROGRESS NOTE ADULT - PROBLEM SELECTOR PLAN 5
- Na 130 on admission, like 2/2 poor PO intake  - s/p 1L IVF with noted improvement to Na 133   - C/w maintenance fluids  - Trend BMP - Alzheimer's type. On admission, patient minimally communicative, likely combination of being hard of hearing, with foreign tongue (Chinese-speaking), with dementia  - Per family, patient is ambulatory at baseline and usually aware of self, occasionally aware of place and time  - C/w memantine and donepezil - Alzheimer's type. On admission, patient minimally communicative, likely combination of being hard of hearing, with foreign tongue (Mandarin-speaking), with dementia  - Per family, patient is ambulatory at baseline and usually aware of self, occasionally aware of place and time  - C/w memantine and donepezil

## 2020-07-04 NOTE — DISCHARGE NOTE PROVIDER - HOSPITAL COURSE
Patient is a 89 year old woman with PMH of HTN, T2DM, Dementia, who was brought in by EMS due to worsening weakness and fatigue and altered mental status and found to have fever and bandemia who was admitted on 7/3 due to concern for sepsis. She was started on fluids and broad spectrum antibiotics. She was reportedly ambulating and oriented a few days prior to onset of symptoms. Work up for fever included blood cultures, COVID PCR x 2 and antibodies and urine culture which were negative. She was also noted to have rhabdomyolysis at admission and treated with IV fluids. Her Aricept was discontinued for possible NMS. Due to persistent fevers while on broad spectrum antibiotics, viral studies were also sent. Due to several loose bowel movements, C diff studies were also sent. Patient is a 89 year old woman with PMH of HTN, T2DM, Dementia, who was brought in by EMS due to worsening weakness, fatigue, and fever of unknown etiology. Found to be septic with unclear source and lab evidence of rhabdomyolysis. Patient started on fluids and empiric broad spectrum antibiotics but continued to spike fevers with no growth on blood cultures / urine cultures negative / COVID19 PCRx2 and antibodies negative. Ultrasound of the R lower extremity identified a DVT below the knee and was likely source of fever. Anticoagulation was not required given DVT location but weekly U/S are required after discharge. Patient stabilized and discharged on home medications.

## 2020-07-04 NOTE — PROGRESS NOTE ADULT - ATTENDING COMMENTS
Pt seen at bedside. Pt presented with weakness, meeting SIRS criteria. Last febrile to 103.3 overnight. No focal source of infection localized, CT chest and abdomen negative, cultures negative. Procalcitonin minorly elevated, COVID negative x2. Additionally, found to have rhabdo. Constellation of symptoms could be 2/2 infection (bacterial vs viral) vs medication side effect from Aricept (Has been linked to rhabdo, fever and NMS)  Will continue broad spectrum abx for now.   Will check RVP, EBV, CMV, HIV  Will treat with supportive care for now for rhabdo and potential NMS. Off statin and Aricept. c/w IVF.   If remains febrile with the above being negative would consult ID Pt seen at bedside. Pt presented with weakness, meeting SIRS criteria. Last febrile to 103.3 overnight. No focal source of infection localized, CT chest and abdomen negative, cultures negative. Procalcitonin minorly elevated, COVID negative x2. Additionally, found to have rhabdo. Constellation of symptoms could be 2/2 infection (bacterial vs viral) vs medication side effect from Aricept (Has been linked to rhabdo, fever and NMS)  Will continue broad spectrum abx for now.   Will check RVP, EBV, CMV, HIV  C diff pending given diarrhea  Will treat with supportive care for now for rhabdo and potential NMS. Off statin and Aricept. c/w IVF, CPK downtrending. Will continue to trend daily for now  If remains febrile with the above being negative would consult ID

## 2020-07-04 NOTE — DISCHARGE NOTE PROVIDER - PROVIDER TOKENS
FREE:[LAST:[[id number]],PHONE:[phone]],FAX:[(   )    -],ADDRESS:[[address] [place] [address]]]

## 2020-07-04 NOTE — PROGRESS NOTE ADULT - PROBLEM SELECTOR PLAN 8
- On admission -147, currently 131   - Takes losartan 50 mg at home  - Will hold given infectious clinical picture, will resume as appropriate DVT ppx: Lovenox  Diet: Dysphagia diet  Disposition: Inpatient pending diagnostic work up, clinical improvement  Code Status: DNR/DNI per mago Zepeda

## 2020-07-04 NOTE — PROGRESS NOTE ADULT - ASSESSMENT
89F with PMH of HTN, T2DM, dementia (unclear baseline), [place] BIBEMS for weakness/fatigue, found to be septic with unclear source, with laboratory evidence of rhabdomyolysis. 89F with PMH of HTN, T2DM, dementia (unclear baseline), [place] BIBEMS for weakness/fatigue, found to be septic with unclear source, with laboratory evidence of rhabdomyolysis, with recurrent high degree fevers since admission while on broad spectrum antibiotics.

## 2020-07-04 NOTE — PHYSICAL THERAPY INITIAL EVALUATION ADULT - PERTINENT HX OF CURRENT PROBLEM, REHAB EVAL
89F with PMH of HTN, T2DM, dementia (unclear baseline), [place] BIBEMS for weakness/fatigue. From chart review EMS called by pt's son for weakness and fatigue. Pt normally ambulatory, but now requiring assistance 2/2 weakness. Also with poor PO intake. Son also noted progressive Alzheimer's dementia. CTAngioChest (-) CTHead (-) CXR (-) XRayPelvis (-)

## 2020-07-04 NOTE — PROGRESS NOTE ADULT - PROBLEM SELECTOR PLAN 7
- History of DM on glipizide XL 5 mg daily at home  - ISS  - Monitor finger sticks with goal 100-180   - F/u A1c - On admission -147  - Takes losartan 50 mg at home  - Will hold given infectious clinical picture and episodes of hypotension, will resume as appropriate

## 2020-07-04 NOTE — PROGRESS NOTE ADULT - PROBLEM SELECTOR PLAN 6
- Alzheimer's type. On admission, patient minimally communicative, likely combination of being hard of hearing, with foreign tongue (Chinese-speaking), with dementia  - Per family, patient is ambulatory at baseline and usually aware of self, occasionally aware of place and time  - C/w memantine and donepezil - History of DM on glipizide XL 5 mg daily at home  - ISS  - Monitor finger sticks with goal 100-180   - F/u A1c

## 2020-07-04 NOTE — PROGRESS NOTE ADULT - PROBLEM SELECTOR PLAN 4
- Noted AST/ALT 270s/120s with NML total bilirubin/Alk phos   - No tenderness elicited on abdominal exam  - CT A/P with no GB/liver pathology noted  - Etiology possibly 2/2 combination of sepsis and rhabdomyolysis  - F/u hepatitides panel, lipid panel  - If transaminitis continues to worsen, will obtain RUQ US

## 2020-07-04 NOTE — PHYSICAL THERAPY INITIAL EVALUATION ADULT - ADDITIONAL COMMENTS
Pt unable to participate in exam. As per chart pt was ambulatory at home, unclear if she required assist or AD.

## 2020-07-04 NOTE — PROGRESS NOTE ADULT - PROBLEM SELECTOR PLAN 10
1.  Name of PCP:  2.  PCP Contacted on Admission: [ ] Y    [ x ] N    3.  PCP contacted at Discharge: [ ] Y    [ ] N    [ ] N/A  4.  Post-Discharge Appointment Date and Location:  5.  Summary of Handoff given to PCP:

## 2020-07-04 NOTE — PROGRESS NOTE ADULT - PROBLEM SELECTOR PLAN 2
- On admissions, bandemia 10.4% + recurrent fever without clear source, unable to elicit potential source from patient given mental status  - UA (-), CXR (-), CT A/P without acute pathology; COVID (-)   - Pending CTA chest for further assessment  - Empirically treat with vanco/zosyn for now, monitor vanco levels   - F/u Bcx, Ucx  - Though COVID-negative, will recheck COVID PCR given systemic symptoms without clear source; will recheck D-dimer/ESR/CRP/Procal  - Monitor vital signs for fever  - [place] [address] on admission, 6806 on 7/3; Admission UA demonstrating "moderate blood" with 13-14 RBCs  - Unclear etiology as per HPI from nephew negative for falls, trauma or strenuous exertion; possibly due to infectious status;  - Statin held in context of rhabdo  - Aricept held due to concern of possible NMS (fevers, rhabdomyolysis)  - Fluid resuscitation x 24 hours  - Trend CMP  - [place] [address] on admission, 6806 on 7/3; Admission UA demonstrating "moderate blood" with 13-14 RBCs  - Unclear etiology as per HPI from nephew negative for falls, trauma or strenuous exertion; possibly due to infectious status;  - Statin held in context of rhabdo  - Aricept held due to concern of possible NMS (fevers, rhabdomyolysis)  - Trend CMP  - Trend CK

## 2020-07-04 NOTE — PROGRESS NOTE ADULT - SUBJECTIVE AND OBJECTIVE BOX
Contact Information:  Jose Juan Ramos II, MD, MPH  PGY-1, Internal Medicine  Pager: [phone] ([facility]) /// [phone] ([facility])    AUGUST DANIELS, MRN-[id number]    Patient is a 89y old  Female who presents with a chief complaint of weakness, fatigue (03 Jul 2020 03:16)      OVERNIGHT EVENTS:   ***  Patient admitted for weakness and fatigue. On admission, ROS limited as patient hard of hearing and speaks Chinese (unable to establish clear communication with patient even with interpretive services). Collateral obtained from nephew Sandoval Duane, states that patient was last in previous state of health 2 days prior to admission, able to ambulate and perform activities of daily living such as eating and using restroom. Nephew notes that on day before presentation, patient was noted to be more lethargic per his words, unable to ambulate as easily with decreased ability to urinate; he also notes that the patient was observed to be chewing and spitting food out instead of eating. He denies observing the following: fever, lightheadedness, dizziness, diarrhea, SOB, cough, N/V. He also denies observing fall, trauma, heavy lifting, or vigorous physical exertion.    SUBJECTIVE: Patient evaluated at bedside; attempts to communicate with patient using interpretive services (Chinese Mandarin/Cantonese  #[id number] unsuccessful because patient only responding with monosyllabic utterings incomprehensive to .     ROS limited because of patient mental status.    OBJECTIVE:  Vital Signs Last 24 Hrs  T(C): 39.7 (03 Jul 2020 02:57), Max: 39.7 (03 Jul 2020 02:57)  T(F): 103.4 (03 Jul 2020 02:57), Max: 103.4 (03 Jul 2020 02:57)  HR: 79 (03 Jul 2020 01:53) (71 - 86)  BP: 131/79 (03 Jul 2020 01:53) (105/61 - 147/82)  BP(mean): --  RR: 18 (03 Jul 2020 01:53) (18 - 20)  SpO2: 95% (03 Jul 2020 01:53) (95% - 97%)  I&O's Summary      MEDICATIONS  (STANDING):  dextrose 5%. 1000 milliLiter(s) (50 mL/Hr) IV Continuous <Continuous>  dextrose 50% Injectable 12.5 Gram(s) IV Push once  dextrose 50% Injectable 25 Gram(s) IV Push once  dextrose 50% Injectable 25 Gram(s) IV Push once  donepezil 10 milliGRAM(s) Oral at bedtime  enoxaparin Injectable 30 milliGRAM(s) SubCutaneous daily  insulin lispro (HumaLOG) corrective regimen sliding scale   SubCutaneous three times a day before meals  insulin lispro (HumaLOG) corrective regimen sliding scale   SubCutaneous at bedtime  lactated ringers. 1000 milliLiter(s) (75 mL/Hr) IV Continuous <Continuous>  memantine 10 milliGRAM(s) Oral two times a day  piperacillin/tazobactam IVPB.. 3.375 Gram(s) IV Intermittent every 8 hours  vancomycin  IVPB 1000 milliGRAM(s) IV Intermittent every 24 hours    MEDICATIONS  (PRN):  dextrose 40% Gel 15 Gram(s) Oral once PRN Blood Glucose LESS THAN 70 milliGRAM(s)/deciliter  glucagon  Injectable 1 milliGRAM(s) IntraMuscular once PRN Glucose LESS THAN 70 milligrams/deciliter    Allergies    No Known Allergies    Intolerances        CONSTITUTIONAL: Awake, somewhat alert but not maintaining complete focus on examiner in the room.  EYES: No scleral icterus. No conjunctival injection.  ENT: Moist oral mucosa. No erythema. No pharyngeal exudates.   RESPIRATORY: Tachypneic to approximately 28. CTAB. No wheezes, rales, or rhonchi. No accessory muscle use.  CARDIOVASCULAR: +S1/S2. No audible S3/S4. Regular rate and rhythm.  GASTROINTESTINAL: Soft, nondistended. +BS. No elicitation of grimacing or discomfort on superficial and deep ABD palpation.  EXTREMITY: No LE swelling or edema. EXTs warm to touch.  MUSCULOSKELETAL: Occasional spontaneous and nonpurposeful movements of EXTs x 4.  NEUROLOGICAL: Unable to assess due to mental status.                            11.2   9.32  )-----------( 193      ( 03 Jul 2020 05:02 )             34.5       07-03    133<L>  |  97  |  14  ----------------------------<  139<H>  4.2   |  21<L>  |  0.55    Ca    8.4      03 Jul 2020 04:36  Phos  2.8     07-03  Mg     1.9     07-03    TPro  7.0  /  Alb  3.6  /  TBili  0.5  /  DBili  x   /  AST  279<H>  /  ALT  124<H>  /  AlkPhos  101  07-03    CAPILLARY BLOOD GLUCOSE      POCT Blood Glucose.: 131 mg/dL (02 Jul 2020 20:09)    LIVER FUNCTIONS - ( 03 Jul 2020 04:36 )  Alb: 3.6 g/dL / Pro: 7.0 g/dL / ALK PHOS: 101 U/L / ALT: 124 U/L / AST: 279 U/L / GGT: x           CARDIAC MARKERS ( 03 Jul 2020 04:36 )  x     / x     / 35641 U/L / x     / x          Urinalysis Basic - ( 02 Jul 2020 23:47 )    Color: Yellow / Appearance: Clear / SG: >1.050 / pH: x  Gluc: x / Ketone: Negative  / Bili: Negative / Urobili: Negative   Blood: x / Protein: 100 / Nitrite: Negative   Leuk Esterase: Negative / RBC: 13 /hpf / WBC 3 /HPF   Sq Epi: x / Non Sq Epi: 6 /hpf / Bacteria: Negative            RADIOLOGY AND ADDITIONAL TESTS:    CONSULTANT NOTES REVIEWED:    CARE DISCUSSED WITH THE FOLLOWING CONSULTANTS/PROVIDERS: AUGUST DANIELS, MRN-[id number]    Patient is a 89y old  Female who presents with a chief complaint of weakness, fatigue (03 Jul 2020 03:16)      OVERNIGHT EVENTS:   Afebrile several times yesterday to 39.4 (1256), 39.6 (2343) and some soft BP yesterday night while down for CTA. COVID PCR x 2 and antibody negative. Still on fluids due to poor PO intake.    ROS limited because of patient mental status.    OBJECTIVE:  Vital Signs Last 24 Hrs  T(C): 39.7 (03 Jul 2020 02:57), Max: 39.7 (03 Jul 2020 02:57)  T(F): 103.4 (03 Jul 2020 02:57), Max: 103.4 (03 Jul 2020 02:57)  HR: 79 (03 Jul 2020 01:53) (71 - 86)  BP: 131/79 (03 Jul 2020 01:53) (105/61 - 147/82)  BP(mean): --  RR: 18 (03 Jul 2020 01:53) (18 - 20)  SpO2: 95% (03 Jul 2020 01:53) (95% - 97%)  I&O's Summary      MEDICATIONS  (STANDING):  dextrose 5%. 1000 milliLiter(s) (50 mL/Hr) IV Continuous <Continuous>  dextrose 50% Injectable 12.5 Gram(s) IV Push once  dextrose 50% Injectable 25 Gram(s) IV Push once  dextrose 50% Injectable 25 Gram(s) IV Push once  donepezil 10 milliGRAM(s) Oral at bedtime  enoxaparin Injectable 30 milliGRAM(s) SubCutaneous daily  insulin lispro (HumaLOG) corrective regimen sliding scale   SubCutaneous three times a day before meals  insulin lispro (HumaLOG) corrective regimen sliding scale   SubCutaneous at bedtime  lactated ringers. 1000 milliLiter(s) (75 mL/Hr) IV Continuous <Continuous>  memantine 10 milliGRAM(s) Oral two times a day  piperacillin/tazobactam IVPB.. 3.375 Gram(s) IV Intermittent every 8 hours  vancomycin  IVPB 1000 milliGRAM(s) IV Intermittent every 24 hours    MEDICATIONS  (PRN):  dextrose 40% Gel 15 Gram(s) Oral once PRN Blood Glucose LESS THAN 70 milliGRAM(s)/deciliter  glucagon  Injectable 1 milliGRAM(s) IntraMuscular once PRN Glucose LESS THAN 70 milligrams/deciliter    Allergies    No Known Allergies    Intolerances        CONSTITUTIONAL: Awake, somewhat alert but not maintaining complete focus on examiner in the room.  EYES: No scleral icterus. No conjunctival injection.  ENT: Moist oral mucosa. No erythema. No pharyngeal exudates.   RESPIRATORY: Tachypneic to approximately 28. CTAB. No wheezes, rales, or rhonchi. No accessory muscle use.  CARDIOVASCULAR: +S1/S2. No audible S3/S4. Regular rate and rhythm.  GASTROINTESTINAL: Soft, nondistended. +BS. No elicitation of grimacing or discomfort on superficial and deep ABD palpation.  EXTREMITY: No LE swelling or edema. EXTs warm to touch.  MUSCULOSKELETAL: Occasional spontaneous and nonpurposeful movements of EXTs x 4.  NEUROLOGICAL: Unable to assess due to mental status.                            11.2   9.32  )-----------( 193      ( 03 Jul 2020 05:02 )             34.5       07-03    133<L>  |  97  |  14  ----------------------------<  139<H>  4.2   |  21<L>  |  0.55    Ca    8.4      03 Jul 2020 04:36  Phos  2.8     07-03  Mg     1.9     07-03    TPro  7.0  /  Alb  3.6  /  TBili  0.5  /  DBili  x   /  AST  279<H>  /  ALT  124<H>  /  AlkPhos  101  07-03    CAPILLARY BLOOD GLUCOSE      POCT Blood Glucose.: 131 mg/dL (02 Jul 2020 20:09)    LIVER FUNCTIONS - ( 03 Jul 2020 04:36 )  Alb: 3.6 g/dL / Pro: 7.0 g/dL / ALK PHOS: 101 U/L / ALT: 124 U/L / AST: 279 U/L / GGT: x           CARDIAC MARKERS ( 03 Jul 2020 04:36 )  x     / x     / 14577 U/L / x     / x          Urinalysis Basic - ( 02 Jul 2020 23:47 )    Color: Yellow / Appearance: Clear / SG: >1.050 / pH: x  Gluc: x / Ketone: Negative  / Bili: Negative / Urobili: Negative   Blood: x / Protein: 100 / Nitrite: Negative   Leuk Esterase: Negative / RBC: 13 /hpf / WBC 3 /HPF   Sq Epi: x / Non Sq Epi: 6 /hpf / Bacteria: Negative            RADIOLOGY AND ADDITIONAL TESTS:    CONSULTANT NOTES REVIEWED:    CARE DISCUSSED WITH THE FOLLOWING CONSULTANTS/PROVIDERS:

## 2020-07-04 NOTE — DISCHARGE NOTE PROVIDER - NSDCMRMEDTOKEN_GEN_ALL_CORE_FT
donepezil 10 mg oral tablet: 1 tab(s) orally once a day (at bedtime)  GlipiZIDE XL 5 mg oral tablet, extended release: 1 tab(s) orally once a day  losartan 50 mg oral tablet: 1 tab(s) orally once a day  memantine 28 mg oral capsule, extended release: 1 cap(s) orally once a day  simvastatin 40 mg oral tablet: 1 tab(s) orally once a day (at bedtime) donepezil 10 mg oral tablet: 1 tab(s) orally once a day (at bedtime)  losartan 50 mg oral tablet: 1 tab(s) orally once a day  memantine 10 mg oral tablet: 1 tab(s) orally 2 times a day  tiotropium 18 mcg inhalation capsule: 1 cap(s) inhaled once a day

## 2020-07-04 NOTE — PROGRESS NOTE ADULT - PROBLEM SELECTOR PLAN 3
- [place] [address]; UA demonstrating moderate blood with 13-14 RBCs  - Unclear etiology as per HPI from nephew negative for falls, trauma or strenuous exertion; possibly due to infectious status; per outpatient medication review, it is noted that patient does take statin  - Statin held in context of rhabdo  - Fluid resuscitation x 24 hours  - Trend CMP  - Trend CK - Presented w/ 1 day of weakness and fatigue, previously ambulatory and more active  - ; low suspicion for iatrogenic causes given current home meds not known to cause patient's constellation of symptoms  - Will check TSH, vitamin levels  - Will f/u infectious w/u  - Will trend CMP  - PT consult pending

## 2020-07-04 NOTE — PHYSICAL THERAPY INITIAL EVALUATION ADULT - GENERAL OBSERVATIONS, REHAB EVAL
pt received semi-supine in bed on contact precautions, IV, prima-fit, primarily Mandarin however secondary to cognitive deficits pt unable to use  services

## 2020-07-04 NOTE — PROGRESS NOTE ADULT - PROBLEM SELECTOR PLAN 1
- Presents w/ 1 day of weakness and fatigue, previously ambulatory and more active  - Ddx quite broad - infectious vs neurological (stroke) vs metabolic vs iatrogenic vs idiopathic  - Patient noted to have bandemia > 10% with acute fever 103.4. CTH negative; labs notable for rhabdomyolysis, otherwise electrolytes WNL; low suspicion for iatrogenic causes given current home meds not known to cause patient's constellation of symptoms  - Will check TSH, vitamin levels  - Will f/u infectious w/u  - Will trend CMP  - PT consult pending - On admissions, bandemia 10.4% + recurrent fever without clear source, unable to elicit potential source from patient given mental status  - UA (-), CXR (-), CT A/P without acute pathology; COVID PCR x 2 and antibodies (-)   - CTA chest non-revealing  - Continue empiric treatment with vanco/zosyn (started 7/4)   - F/u Bcx, Ucx - No BCs from prior to antibiotic beginning. BC x 2 7/3 with NGTD and UC NGTD  - Monitor vital signs for fever  - Tylenol PRN with awareness of possible liver toxicity - On admissions, bandemia 10.4% + recurrent fever without clear source, unable to elicit potential source from patient given mental status  - UA (-), CXR (-), CT A/P without acute pathology; COVID PCR x 2 and antibodies (-)   - CTA chest non-revealing  - Continue empiric treatment with vanco/zosyn (started 7/4)   - F/u Bcx, Ucx - No BCs from prior to antibiotic beginning. BC x 2 7/3 with NGTD and UC NGTD  - Will send for viral studies - HIV, RVP, EBC, CMV  - Monitor vital signs for fever  - Tylenol PRN with awareness of possible liver toxicity

## 2020-07-04 NOTE — PROGRESS NOTE ADULT - PROBLEM SELECTOR PLAN 9
DVT ppx: Lovenox  Diet: Dysphagia diet  Disposition:   Code Status: DNR/DNI per mago Zepeda 1.  Name of PCP:  2.  PCP Contacted on Admission: [ ] Y    [ x ] N    3.  PCP contacted at Discharge: [ ] Y    [ ] N    [ ] N/A  4.  Post-Discharge Appointment Date and Location:  5.  Summary of Handoff given to PCP:

## 2020-07-04 NOTE — DISCHARGE NOTE PROVIDER - NSDCCPCAREPLAN_GEN_ALL_CORE_FT
PRINCIPAL DISCHARGE DIAGNOSIS  Diagnosis: Acute deep vein thrombosis (DVT) of other vein of lower extremity  Assessment and Plan of Treatment: Acute deep vein thrombosis (DVT) of R soleal vein   Given location of DVT (below the knee) anti-coagulation medication is not required but weekly Ultrasound of LE required to access status of clot after discharge

## 2020-07-04 NOTE — DISCHARGE NOTE PROVIDER - CARE PROVIDER_API CALL
[address] [facility],   [address] [address]  Phone: [phone]  Fax: (   )    -  Follow Up Time:

## 2020-07-05 LAB
ALBUMIN SERPL ELPH-MCNC: 3 G/DL — LOW (ref 3.3–5)
ALP SERPL-CCNC: 76 U/L — SIGNIFICANT CHANGE UP (ref 40–120)
ALT FLD-CCNC: 97 U/L — HIGH (ref 10–45)
ANION GAP SERPL CALC-SCNC: 13 MMOL/L — SIGNIFICANT CHANGE UP (ref 5–17)
AST SERPL-CCNC: 191 U/L — HIGH (ref 10–40)
BILIRUB SERPL-MCNC: 0.4 MG/DL — SIGNIFICANT CHANGE UP (ref 0.2–1.2)
BUN SERPL-MCNC: 15 MG/DL — SIGNIFICANT CHANGE UP (ref 7–23)
CALCIUM SERPL-MCNC: 8 MG/DL — LOW (ref 8.4–10.5)
CHLORIDE SERPL-SCNC: 96 MMOL/L — SIGNIFICANT CHANGE UP (ref 96–108)
CK SERPL-CCNC: 3094 U/L — HIGH (ref 25–170)
CO2 SERPL-SCNC: 21 MMOL/L — LOW (ref 22–31)
CREAT SERPL-MCNC: 0.57 MG/DL — SIGNIFICANT CHANGE UP (ref 0.5–1.3)
GLUCOSE SERPL-MCNC: 131 MG/DL — HIGH (ref 70–99)
HCT VFR BLD CALC: 32.5 % — LOW (ref 34.5–45)
HGB BLD-MCNC: 10.6 G/DL — LOW (ref 11.5–15.5)
MAGNESIUM SERPL-MCNC: 1.9 MG/DL — SIGNIFICANT CHANGE UP (ref 1.6–2.6)
MCHC RBC-ENTMCNC: 29.8 PG — SIGNIFICANT CHANGE UP (ref 27–34)
MCHC RBC-ENTMCNC: 32.6 GM/DL — SIGNIFICANT CHANGE UP (ref 32–36)
MCV RBC AUTO: 91.3 FL — SIGNIFICANT CHANGE UP (ref 80–100)
NRBC # BLD: 0 /100 WBCS — SIGNIFICANT CHANGE UP (ref 0–0)
PHOSPHATE SERPL-MCNC: 2.4 MG/DL — LOW (ref 2.5–4.5)
PLATELET # BLD AUTO: 169 K/UL — SIGNIFICANT CHANGE UP (ref 150–400)
POTASSIUM SERPL-MCNC: 3.3 MMOL/L — LOW (ref 3.5–5.3)
POTASSIUM SERPL-SCNC: 3.3 MMOL/L — LOW (ref 3.5–5.3)
PROT SERPL-MCNC: 6.3 G/DL — SIGNIFICANT CHANGE UP (ref 6–8.3)
RBC # BLD: 3.56 M/UL — LOW (ref 3.8–5.2)
RBC # FLD: 14.1 % — SIGNIFICANT CHANGE UP (ref 10.3–14.5)
SODIUM SERPL-SCNC: 130 MMOL/L — LOW (ref 135–145)
WBC # BLD: 8.01 K/UL — SIGNIFICANT CHANGE UP (ref 3.8–10.5)
WBC # FLD AUTO: 8.01 K/UL — SIGNIFICANT CHANGE UP (ref 3.8–10.5)

## 2020-07-05 PROCEDURE — 99222 1ST HOSP IP/OBS MODERATE 55: CPT | Mod: GC

## 2020-07-05 PROCEDURE — 99233 SBSQ HOSP IP/OBS HIGH 50: CPT | Mod: GC

## 2020-07-05 RX ORDER — ALBUTEROL 90 UG/1
1 AEROSOL, METERED ORAL EVERY 4 HOURS
Refills: 0 | Status: DISCONTINUED | OUTPATIENT
Start: 2020-07-05 | End: 2020-07-07

## 2020-07-05 RX ORDER — SODIUM,POTASSIUM PHOSPHATES 278-250MG
1 POWDER IN PACKET (EA) ORAL
Refills: 0 | Status: COMPLETED | OUTPATIENT
Start: 2020-07-05 | End: 2020-07-06

## 2020-07-05 RX ORDER — POTASSIUM CHLORIDE 20 MEQ
20 PACKET (EA) ORAL
Refills: 0 | Status: COMPLETED | OUTPATIENT
Start: 2020-07-05 | End: 2020-07-06

## 2020-07-05 RX ORDER — ACETAMINOPHEN 500 MG
650 TABLET ORAL EVERY 6 HOURS
Refills: 0 | Status: DISCONTINUED | OUTPATIENT
Start: 2020-07-05 | End: 2020-07-08

## 2020-07-05 RX ORDER — IPRATROPIUM/ALBUTEROL SULFATE 18-103MCG
3 AEROSOL WITH ADAPTER (GRAM) INHALATION ONCE
Refills: 0 | Status: COMPLETED | OUTPATIENT
Start: 2020-07-05 | End: 2021-06-03

## 2020-07-05 RX ORDER — CEFTRIAXONE 500 MG/1
1000 INJECTION, POWDER, FOR SOLUTION INTRAMUSCULAR; INTRAVENOUS EVERY 24 HOURS
Refills: 0 | Status: DISCONTINUED | OUTPATIENT
Start: 2020-07-05 | End: 2020-07-06

## 2020-07-05 RX ORDER — IPRATROPIUM/ALBUTEROL SULFATE 18-103MCG
3 AEROSOL WITH ADAPTER (GRAM) INHALATION ONCE
Refills: 0 | Status: COMPLETED | OUTPATIENT
Start: 2020-07-05 | End: 2020-07-05

## 2020-07-05 RX ORDER — IPRATROPIUM/ALBUTEROL SULFATE 18-103MCG
3 AEROSOL WITH ADAPTER (GRAM) INHALATION EVERY 6 HOURS
Refills: 0 | Status: DISCONTINUED | OUTPATIENT
Start: 2020-07-05 | End: 2020-07-07

## 2020-07-05 RX ORDER — TIOTROPIUM BROMIDE 18 UG/1
1 CAPSULE ORAL; RESPIRATORY (INHALATION) DAILY
Refills: 0 | Status: DISCONTINUED | OUTPATIENT
Start: 2020-07-05 | End: 2020-07-08

## 2020-07-05 RX ADMIN — Medication 1 PACKET(S): at 17:57

## 2020-07-05 RX ADMIN — ENOXAPARIN SODIUM 30 MILLIGRAM(S): 100 INJECTION SUBCUTANEOUS at 11:09

## 2020-07-05 RX ADMIN — MEMANTINE HYDROCHLORIDE 10 MILLIGRAM(S): 10 TABLET ORAL at 17:58

## 2020-07-05 RX ADMIN — MEMANTINE HYDROCHLORIDE 10 MILLIGRAM(S): 10 TABLET ORAL at 06:14

## 2020-07-05 RX ADMIN — PIPERACILLIN AND TAZOBACTAM 25 GRAM(S): 4; .5 INJECTION, POWDER, LYOPHILIZED, FOR SOLUTION INTRAVENOUS at 11:08

## 2020-07-05 RX ADMIN — Medication 3 MILLILITER(S): at 18:38

## 2020-07-05 RX ADMIN — Medication 3 MILLILITER(S): at 23:52

## 2020-07-05 RX ADMIN — Medication 20 MILLIEQUIVALENT(S): at 17:57

## 2020-07-05 RX ADMIN — Medication 650 MILLIGRAM(S): at 12:23

## 2020-07-05 RX ADMIN — Medication 3 MILLILITER(S): at 06:14

## 2020-07-05 RX ADMIN — PIPERACILLIN AND TAZOBACTAM 25 GRAM(S): 4; .5 INJECTION, POWDER, LYOPHILIZED, FOR SOLUTION INTRAVENOUS at 02:39

## 2020-07-05 RX ADMIN — Medication 650 MILLIGRAM(S): at 23:52

## 2020-07-05 RX ADMIN — Medication 250 MILLIGRAM(S): at 02:33

## 2020-07-05 RX ADMIN — CEFTRIAXONE 100 MILLIGRAM(S): 500 INJECTION, POWDER, FOR SOLUTION INTRAMUSCULAR; INTRAVENOUS at 17:58

## 2020-07-05 NOTE — PROGRESS NOTE ADULT - SUBJECTIVE AND OBJECTIVE BOX
Contact Information:  Jose Juan Ramos II, MD, MPH  PGY-1, Internal Medicine  Pager: [phone] ([facility]) /// [phone] ([facility])    AUGUST DANIELS, [id number]    Patient is a 89y old  Female who presents with a chief complaint of weakness, fatigue (04 Jul 2020 23:11)      OVERNIGHT EVENTS:    SUBJECTIVE:    CONSTITUTIONAL: No weakness. No fatigue. No fever.  HEAD: No head trauma.   EYES: No vision changes.  ENT: No hearing changes or tinnitus. No ear pain. No changes in smell. No nasal congestion or discharge. No sore throat. No voice hoarseness.   NECK: No neck pain or stiffness. No lumps.  RESPIRATORY: No cough. No SOB. No wheezing. No hemoptysis.   CARDIOVASCULAR: No chest pain. No palpitations.   GASTROINTESTINAL: No dysphagia. No ABD pain. No distension. No constipation. No diarrhea. No pain with defecation. No hematemesis. No hematochezia or melena.  BACK: No back pain.  GENITOURINARY: No dysuria. No frequency or urgency. No hesitancy. No incontinence. No urinary retention. No suprapubic pain. No hematuria.  EXTREMITY: No swelling.  MUSCULOSKELETAL: No joint pain or swelling. No fractures. No stiffness.    SKIN: No rashes. No itching. No skin, hair, or nail changes.  NEUROLOGICAL: No weakness or paralysis. No lightheadedness or dizziness. No HA. No numbness or tingling.   PSYCHIATRIC: No depression.       OBJECTIVE:  Vital Signs Last 24 Hrs  T(C): 37.4 (05 Jul 2020 04:57), Max: 37.4 (05 Jul 2020 04:57)  T(F): 99.3 (05 Jul 2020 04:57), Max: 99.3 (05 Jul 2020 04:57)  HR: 79 (05 Jul 2020 04:57) (62 - 79)  BP: 123/64 (05 Jul 2020 04:57) (116/70 - 152/73)  BP(mean): --  RR: 20 (05 Jul 2020 05:03) (18 - 22)  SpO2: 99% (05 Jul 2020 05:03) (88% - 100%)  I&O's Summary    03 Jul 2020 07:01  -  04 Jul 2020 07:00  --------------------------------------------------------  IN: 1080 mL / OUT: 400 mL / NET: 680 mL    04 Jul 2020 07:01  -  05 Jul 2020 06:49  --------------------------------------------------------  IN: 1350 mL / OUT: 300 mL / NET: 1050 mL        MEDICATIONS  (STANDING):  dextrose 5%. 1000 milliLiter(s) (50 mL/Hr) IV Continuous <Continuous>  dextrose 50% Injectable 12.5 Gram(s) IV Push once  dextrose 50% Injectable 25 Gram(s) IV Push once  dextrose 50% Injectable 25 Gram(s) IV Push once  enoxaparin Injectable 30 milliGRAM(s) SubCutaneous daily  insulin lispro (HumaLOG) corrective regimen sliding scale   SubCutaneous three times a day before meals  insulin lispro (HumaLOG) corrective regimen sliding scale   SubCutaneous at bedtime  lactated ringers. 1000 milliLiter(s) (125 mL/Hr) IV Continuous <Continuous>  memantine 10 milliGRAM(s) Oral two times a day  piperacillin/tazobactam IVPB.. 3.375 Gram(s) IV Intermittent every 8 hours  vancomycin  IVPB 1000 milliGRAM(s) IV Intermittent every 24 hours    MEDICATIONS  (PRN):  dextrose 40% Gel 15 Gram(s) Oral once PRN Blood Glucose LESS THAN 70 milliGRAM(s)/deciliter  glucagon  Injectable 1 milliGRAM(s) IntraMuscular once PRN Glucose LESS THAN 70 milligrams/deciliter    Allergies    No Known Allergies    Intolerances        CONSTITUTIONAL: No acute distress. Awake and alert.  HEAD: No evidence of trauma. Structures WNL.  EYES: +PERRL. +EOMI. No scleral icterus. No conjunctival injection.  ENT: Moist oral mucosa. No erythema. No pharyngeal exudates.   NECK: Supple. Appropriate ROM. No stiffness. No masses or lymphadenopathy.  RESPIRATORY: CTAB. No wheezes, rales, or rhonchi. No accessory muscle use. No apparent respiratory distress.  CARDIOVASCULAR: +S1/S2. No audible S3/S4. Regular rate and rhythm. No murmurs, rubs, or gallops. 2+ radial pulses x b/l UE; 2+ DP pulses x b/l LE.   GASTROINTESTINAL: Soft, nontender, nondistended. +BS. No rebound or guarding.   BACK: No spinal or paraspinal tenderness. No CVA tenderness.  EXTREMITY: No LE swelling or edema. EXTs warm to touch.  MUSCULOSKELETAL: Spontaneous movement in all extremities.  DERMATOLOGICAL: No abnormal rashes or lesions.  NEUROLOGICAL: CN 2-12 grossly intact. No focal deficits. Sensation intact x 4EXT. A&Ox3 (oriented to person, place, and time).  PSYCHIATRIC: Appropriate affect.                            10.7   8.55  )-----------( 180      ( 04 Jul 2020 06:46 )             33.5       07-04    132<L>  |  98  |  16  ----------------------------<  138<H>  3.8   |  22  |  0.63    Ca    8.0<L>      04 Jul 2020 06:46  Phos  2.4     07-04  Mg     1.9     07-04    TPro  6.1  /  Alb  3.0<L>  /  TBili  0.4  /  DBili  x   /  AST  239<H>  /  ALT  111<H>  /  AlkPhos  82  07-04    CAPILLARY BLOOD GLUCOSE      POCT Blood Glucose.: 140 mg/dL (04 Jul 2020 22:10)  POCT Blood Glucose.: 137 mg/dL (04 Jul 2020 17:26)  POCT Blood Glucose.: 137 mg/dL (04 Jul 2020 12:02)  POCT Blood Glucose.: 134 mg/dL (04 Jul 2020 07:46)    LIVER FUNCTIONS - ( 04 Jul 2020 06:46 )  Alb: 3.0 g/dL / Pro: 6.1 g/dL / ALK PHOS: 82 U/L / ALT: 111 U/L / AST: 239 U/L / GGT: x           CARDIAC MARKERS ( 04 Jul 2020 06:46 )  x     / x     / 6806 U/L / x     / x              Culture - Blood (collected 03 Jul 2020 09:59)  Source: .Blood Blood-Venous  Preliminary Report (04 Jul 2020 10:01):    No growth to date.    Culture - Blood (collected 03 Jul 2020 09:59)  Source: .Blood Blood-Peripheral  Preliminary Report (04 Jul 2020 10:01):    No growth to date.    Culture - Urine (collected 03 Jul 2020 03:11)  Source: .Urine Clean Catch (Midstream)  Final Report (04 Jul 2020 07:22):    <10,000 CFU/mL Normal Urogenital Nneka          RADIOLOGY AND ADDITIONAL TESTS:    CONSULTANT NOTES REVIEWED:    CARE DISCUSSED WITH THE FOLLOWING CONSULTANTS/PROVIDERS: Contact Information:  Jose Juan Ramos II, MD, MPH  PGY-1, Internal Medicine  Pager: [phone] ([facility]) /// [phone] ([facility])    AUGUST DANIELS, MRN-[id number]    Patient is a 89y old  Female who presents with a chief complaint of weakness, fatigue (04 Jul 2020 23:11)      OVERNIGHT EVENTS: Patient with acute desaturation to 88%, placed on NC 2L.    SUBJECTIVE: Patient evaluated at bedside, unable to obtain ROS due to mental status (dementia), [place], language barrier.    ROS - unable to obtain.      OBJECTIVE:  Vital Signs Last 24 Hrs  T(C): 37.4 (05 Jul 2020 04:57), Max: 37.4 (05 Jul 2020 04:57)  T(F): 99.3 (05 Jul 2020 04:57), Max: 99.3 (05 Jul 2020 04:57)  HR: 79 (05 Jul 2020 04:57) (62 - 79)  BP: 123/64 (05 Jul 2020 04:57) (116/70 - 152/73)  BP(mean): --  RR: 20 (05 Jul 2020 05:03) (18 - 22)  SpO2: 99% (05 Jul 2020 05:03) (88% - 100%)  I&O's Summary    03 Jul 2020 07:01  -  04 Jul 2020 07:00  --------------------------------------------------------  IN: 1080 mL / OUT: 400 mL / NET: 680 mL    04 Jul 2020 07:01  -  05 Jul 2020 06:49  --------------------------------------------------------  IN: 1350 mL / OUT: 300 mL / NET: 1050 mL        MEDICATIONS  (STANDING):  dextrose 5%. 1000 milliLiter(s) (50 mL/Hr) IV Continuous <Continuous>  dextrose 50% Injectable 12.5 Gram(s) IV Push once  dextrose 50% Injectable 25 Gram(s) IV Push once  dextrose 50% Injectable 25 Gram(s) IV Push once  enoxaparin Injectable 30 milliGRAM(s) SubCutaneous daily  insulin lispro (HumaLOG) corrective regimen sliding scale   SubCutaneous three times a day before meals  insulin lispro (HumaLOG) corrective regimen sliding scale   SubCutaneous at bedtime  lactated ringers. 1000 milliLiter(s) (125 mL/Hr) IV Continuous <Continuous>  memantine 10 milliGRAM(s) Oral two times a day  piperacillin/tazobactam IVPB.. 3.375 Gram(s) IV Intermittent every 8 hours  vancomycin  IVPB 1000 milliGRAM(s) IV Intermittent every 24 hours    MEDICATIONS  (PRN):  dextrose 40% Gel 15 Gram(s) Oral once PRN Blood Glucose LESS THAN 70 milliGRAM(s)/deciliter  glucagon  Injectable 1 milliGRAM(s) IntraMuscular once PRN Glucose LESS THAN 70 milligrams/deciliter    Allergies    No Known Allergies    Intolerances        CONSTITUTIONAL: Awake, able to track interview but with minimal capacity for meaningful interaction.  RESPIRATORY: CTAB in anterior lung fields. No wheezes, rales, or rhonchi. No accessory muscle use.  CARDIOVASCULAR: +S1/S2. No audible S3/S4. Regular rate and rhythm. No murmurs, rubs, or gallops.   GASTROINTESTINAL: Soft, nondistended. Palpation of ABD elicits no grimacing or withdrawal. +BS. No rebound or guarding.   EXTREMITY: No LE swelling or edema.  MUSCULOSKELETAL: Occasional spontaneous nonpurposeful movement in EXTs.  NEUROLOGICAL: Unable to assess due to mental status.                            10.7   8.55  )-----------( 180      ( 04 Jul 2020 06:46 )             33.5       07-04    132<L>  |  98  |  16  ----------------------------<  138<H>  3.8   |  22  |  0.63    Ca    8.0<L>      04 Jul 2020 06:46  Phos  2.4     07-04  Mg     1.9     07-04    TPro  6.1  /  Alb  3.0<L>  /  TBili  0.4  /  DBili  x   /  AST  239<H>  /  ALT  111<H>  /  AlkPhos  82  07-04    CAPILLARY BLOOD GLUCOSE      POCT Blood Glucose.: 140 mg/dL (04 Jul 2020 22:10)  POCT Blood Glucose.: 137 mg/dL (04 Jul 2020 17:26)  POCT Blood Glucose.: 137 mg/dL (04 Jul 2020 12:02)  POCT Blood Glucose.: 134 mg/dL (04 Jul 2020 07:46)    LIVER FUNCTIONS - ( 04 Jul 2020 06:46 )  Alb: 3.0 g/dL / Pro: 6.1 g/dL / ALK PHOS: 82 U/L / ALT: 111 U/L / AST: 239 U/L / GGT: x           CARDIAC MARKERS ( 04 Jul 2020 06:46 )  x     / x     / 6806 U/L / x     / x              Culture - Blood (collected 03 Jul 2020 09:59)  Source: .Blood Blood-Venous  Preliminary Report (04 Jul 2020 10:01):    No growth to date.    Culture - Blood (collected 03 Jul 2020 09:59)  Source: .Blood Blood-Peripheral  Preliminary Report (04 Jul 2020 10:01):    No growth to date.    Culture - Urine (collected 03 Jul 2020 03:11)  Source: .Urine Clean Catch (Midstream)  Final Report (04 Jul 2020 07:22):    <10,000 CFU/mL Normal Urogenital Nneka          RADIOLOGY AND ADDITIONAL TESTS:    CONSULTANT NOTES REVIEWED:    CARE DISCUSSED WITH THE FOLLOWING CONSULTANTS/PROVIDERS:

## 2020-07-05 NOTE — PROGRESS NOTE ADULT - PROBLEM SELECTOR PLAN 5
- Alzheimer's type. On admission, patient minimally communicative, likely combination of being hard of hearing, with foreign tongue (Mandarin-speaking), with dementia  - Per family, patient is ambulatory at baseline and usually aware of self, occasionally aware of place and time  - C/w memantine and donepezil - Alzheimer's type. On admission, patient minimally communicative, likely combination of being hard of hearing, with foreign tongue (Mandarin-speaking), with dementia  - Per family, patient is ambulatory at baseline and usually aware of self, occasionally aware of place and time  - C/w memantine  - Donepezil held due to link to fever, rhabdo

## 2020-07-05 NOTE — PROGRESS NOTE ADULT - PROBLEM SELECTOR PLAN 8
DVT ppx: Lovenox  Diet: Dysphagia diet  Disposition: Inpatient pending diagnostic work up, clinical improvement  Code Status: DNR/DNI per mago Zepeda

## 2020-07-05 NOTE — PROGRESS NOTE ADULT - PROBLEM SELECTOR PLAN 3
- Presented w/ 1 day of weakness and fatigue, previously ambulatory and more active  - ; low suspicion for iatrogenic causes given current home meds not known to cause patient's constellation of symptoms  - Will check TSH, vitamin levels  - Will f/u infectious w/u  - Will trend CMP  - PT consult pending - Presented w/ 1 day of weakness and fatigue, previously ambulatory and more active  - Low suspicion for iatrogenic causes given current home meds not known to cause patient's constellation of symptoms  - TSH, vitamin levels WNL  - Infectious w/u largely negative  - Will trend CMP  - PT consult pending

## 2020-07-05 NOTE — PROGRESS NOTE ADULT - PROBLEM SELECTOR PLAN 1
- On admissions, bandemia 10.4% + recurrent fever without clear source, unable to elicit potential source from patient given mental status  - UA (-), CXR (-), CT A/P without acute pathology; COVID PCR x 2 and antibodies (-)   - CTA chest non-revealing  - Continue empiric treatment with vanco/zosyn (started 7/4)   - F/u Bcx, Ucx - No BCs from prior to antibiotic beginning. BC x 2 7/3 with NGTD and UC NGTD  - Will send for viral studies - HIV, RVP, EBC, CMV  - Monitor vital signs for fever  - Tylenol PRN with awareness of possible liver toxicity - On admissions, bandemia 10.4% + recurrent fever without clear source, unable to elicit potential source from patient given mental status  - UA (-), CXR (-), CT A/P without acute pathology; COVID PCR x 2 and antibodies (-)   - CTA chest non-revealing  - Empiric Abx discontinued   - Bcx, Ucx negative  - F/u EBV, CMV; RVP, HIV negative  - Monitor vital signs for fever  - Tylenol PRN with awareness of possible liver toxicity  - ID consulted, will f/u recs - On admissions, bandemia 10.4% + recurrent fever without clear source, unable to elicit potential source from patient given mental status  - UA (-), CXR (-), CT A/P without acute pathology; COVID PCR x 2 and antibodies (-)   - CTA chest non-revealing  - Bcx, Ucx negative  - F/u EBV, CMV; RVP, HIV negative  - Patient noted to have loose stools; C. Diff negative  - Monitor vital signs for fever  - Tylenol PRN with awareness of possible liver toxicity (though LFTs downtrending)  - ID consulted, f/u GI PCR and start ceftriaxone; if GI PCR negative, can stop CTX and monitor off Abx

## 2020-07-05 NOTE — CONSULT NOTE ADULT - SUBJECTIVE AND OBJECTIVE BOX
Patient is a 89y old  Female who presents with a chief complaint of weakness, fatigue (05 Jul 2020 06:49)    HPI:  89F with PMH of HTN, T2DM, dementia (unclear baseline), [place] BIBEMS for weakness/fatigue. Pt is [place] and possible language barrier - tried to speak with pt via Mandarin/Cantonese  ([facility] ID#[id number]) but  unable to understand pt; unable to obtain history from pt. Multiple calls made to emergency contacts in chart, unable to reach family, VMs left.     From chart review EMS called by pt's son for weakness and fatigue. Pt normally ambulatory, but now requiring assistance 2/2 weakness. Also with poor PO intake. Son also noted progressive Alzheimer's dementia. (03 Jul 2020 03:16)     prior hospital charts reviewed [  ]  primary team notes reviewed [  ]  other consultant notes reviewed [  ]    PAST MEDICAL & SURGICAL HISTORY:  Glaucoma  HLD (hyperlipidemia)  Diabetes  Hypertension  H/O eye surgery: right eye glaucoma surgery in the past    Allergies  No Known Allergies    ANTIMICROBIALS (past 90 days)  MEDICATIONS  (STANDING):  piperacillin/tazobactam IVPB.   200 mL/Hr IV Intermittent (07-03-20 @ 03:47)    piperacillin/tazobactam IVPB..   25 mL/Hr IV Intermittent (07-05-20 @ 11:08)   25 mL/Hr IV Intermittent (07-05-20 @ 02:39)   25 mL/Hr IV Intermittent (07-04-20 @ 18:52)   25 mL/Hr IV Intermittent (07-04-20 @ 12:06)   25 mL/Hr IV Intermittent (07-04-20 @ 02:05)   25 mL/Hr IV Intermittent (07-03-20 @ 18:57)   25 mL/Hr IV Intermittent (07-03-20 @ 11:06)    vancomycin  IVPB   250 mL/Hr IV Intermittent (07-05-20 @ 02:33)   250 mL/Hr IV Intermittent (07-04-20 @ 02:28)   250 mL/Hr IV Intermittent (07-03-20 @ 05:17)      ANTIMICROBIALS:      OTHER MEDS: MEDICATIONS  (STANDING):  acetaminophen    Suspension .. 650 every 6 hours PRN  dextrose 40% Gel 15 once PRN  dextrose 50% Injectable 12.5 once  dextrose 50% Injectable 25 once  dextrose 50% Injectable 25 once  enoxaparin Injectable 30 daily  glucagon  Injectable 1 once PRN  insulin lispro (HumaLOG) corrective regimen sliding scale  three times a day before meals  insulin lispro (HumaLOG) corrective regimen sliding scale  at bedtime  memantine 10 two times a day    SOCIAL HISTORY:   hx smoking  non-smoker    FAMILY HISTORY:  No pertinent family history in first degree relatives    REVIEW OF SYSTEMS  [  ] ROS unobtainable because:    [  ] All other systems negative except as noted below:	    Constitutional:  [ ] fever [ ] chills  [ ] weight loss  [ ] weakness  Skin:  [ ] rash [ ] phlebitis	  Eyes: [ ] icterus [ ] pain  [ ] discharge	  ENMT: [ ] sore throat  [ ] thrush [ ] ulcers [ ] exudates  Respiratory: [ ] dyspnea [ ] hemoptysis [ ] cough [ ] sputum	  Cardiovascular:  [ ] chest pain [ ] palpitations [ ] edema	  Gastrointestinal:  [ ] nausea [ ] vomiting [ ] diarrhea [ ] constipation [ ] pain	  Genitourinary:  [ ] dysuria [ ] frequency [ ] hematuria [ ] discharge [ ] flank pain  [ ] incontinence  Musculoskeletal:  [ ] myalgias [ ] arthralgias [ ] arthritis  [ ] back pain  Neurological:  [ ] headache [ ] seizures  [ ] confusion/altered mental status  Psychiatric:  [ ] anxiety [ ] depression	  Hematology/Lymphatics:  [ ] lymphadenopathy  Endocrine:  [ ] adrenal [ ] thyroid  Allergic/Immunologic:	 [ ] transplant [ ] seasonal    Vital Signs Last 24 Hrs  T(F): 99.1 (07-05-20 @ 14:33), Max: 103.4 (07-03-20 @ 02:57)  Vital Signs Last 24 Hrs  HR: 70 (07-05-20 @ 13:20) (62 - 79)  BP: 96/60 (07-05-20 @ 13:20) (96/60 - 152/73)  RR: 18 (07-05-20 @ 13:20)  SpO2: 96% (07-05-20 @ 13:20) (88% - 99%)  Wt(kg): --    EXAM:  Constitutional: Not in acute distress  Eyes: pupils bilaterally reactive to light. No icterus.  Oral cavity: Clear, no lesions  Neck: No neck vein distension noted  RS: Chest clear to auscultation bilaterally. No wheeze/rhonchi/crepitations.  CVS: S1, S2 heard. Regular rate and rhythm. No murmurs/rubs/gallops.  Abdomen: Soft. No guarding/rigidity/tenderness.  : No acute abnormalities  Extremities: Warm. No pedal edema  Skin: No lesions noted  Vascular: No evidence of phlebitis  Neuro: Alert, oriented to time/place/person                          10.6   8.01  )-----------( 169      ( 05 Jul 2020 06:54 )             32.5     07-05    130<L>  |  96  |  15  ----------------------------<  131<H>  3.3<L>   |  21<L>  |  0.57    Ca    8.0<L>      05 Jul 2020 06:57  Phos  2.4     07-05  Mg     1.9     07-05    TPro  6.3  /  Alb  3.0<L>  /  TBili  0.4  /  DBili  x   /  AST  191<H>  /  ALT  97<H>  /  AlkPhos  76  07-05    MICROBIOLOGY:  Culture - Blood (collected 03 Jul 2020 09:59)  Source: .Blood Blood-Venous  Preliminary Report (04 Jul 2020 10:01):    No growth to date.    Culture - Blood (collected 03 Jul 2020 09:59)  Source: .Blood Blood-Peripheral  Preliminary Report (04 Jul 2020 10:01):    No growth to date.    Culture - Urine (collected 03 Jul 2020 03:11)  Source: .Urine Clean Catch (Midstream)  Final Report (04 Jul 2020 07:22):    <10,000 CFU/mL Normal Urogenital Nneka              Rapid RVP Result: NotDetec (07-04 @ 14:07)        RADIOLOGY:  imaging below personally reviewed  < from: CT Angio Chest w/ IV Cont (07.03.20 @ 23:02) >  IMPRESSION:   No pulmonary embolus.    < from: CT Abdomen and Pelvis w/ IV Cont (07.02.20 @ 21:50) >  IMPRESSION:   Etiology for patient's abdominal pain is not elucidated in this study.  < end of copied text >        OTHER TESTS: Patient is a 89y old  Female who presents with a chief complaint of weakness, fatigue (05 Jul 2020 06:49)    HPI:  89F with PMH of HTN, T2DM, dementia (unclear baseline), [place] BIBEMS for weakness/fatigue. Pt is [place] and possible language barrier - tried to speak with pt via Mandarin/Cantonese  ([facility] ID#[id number]) but  unable to understand pt; unable to obtain history from pt. Multiple calls made to emergency contacts in chart, unable to reach family, VMs left.     From chart review EMS called by pt's son for weakness and fatigue. Pt normally ambulatory, but now requiring assistance 2/2 weakness. Also with poor PO intake. Son also noted progressive Alzheimer's dementia. (03 Jul 2020 03:16)  =======  - ID team consulted for persistent fevers  - patient assessed at bedside. Alert, awake, hard of hearing. ROS could not be obtained while using Mandarin  # [id number]     primary team notes reviewed [x]    PAST MEDICAL & SURGICAL HISTORY:  Glaucoma  HLD (hyperlipidemia)  Diabetes  Hypertension  H/O eye surgery: right eye glaucoma surgery in the past    Allergies  No Known Allergies    ANTIMICROBIALS (past 90 days)  MEDICATIONS  (STANDING):  piperacillin/tazobactam IVPB.   200 mL/Hr IV Intermittent (07-03-20 @ 03:47)    piperacillin/tazobactam IVPB..   25 mL/Hr IV Intermittent (07-05-20 @ 11:08)   25 mL/Hr IV Intermittent (07-05-20 @ 02:39)   25 mL/Hr IV Intermittent (07-04-20 @ 18:52)   25 mL/Hr IV Intermittent (07-04-20 @ 12:06)   25 mL/Hr IV Intermittent (07-04-20 @ 02:05)   25 mL/Hr IV Intermittent (07-03-20 @ 18:57)   25 mL/Hr IV Intermittent (07-03-20 @ 11:06)    vancomycin  IVPB   250 mL/Hr IV Intermittent (07-05-20 @ 02:33)   250 mL/Hr IV Intermittent (07-04-20 @ 02:28)   250 mL/Hr IV Intermittent (07-03-20 @ 05:17)      ANTIMICROBIALS:      OTHER MEDS: MEDICATIONS  (STANDING):  acetaminophen    Suspension .. 650 every 6 hours PRN  dextrose 40% Gel 15 once PRN  dextrose 50% Injectable 12.5 once  dextrose 50% Injectable 25 once  dextrose 50% Injectable 25 once  enoxaparin Injectable 30 daily  glucagon  Injectable 1 once PRN  insulin lispro (HumaLOG) corrective regimen sliding scale  three times a day before meals  insulin lispro (HumaLOG) corrective regimen sliding scale  at bedtime  memantine 10 two times a day    SOCIAL HISTORY: Unknown    FAMILY HISTORY: Unknown    REVIEW OF SYSTEMS  [x] ROS unobtainable because:  could not be obtained  [  ] All other systems negative except as noted below:	  Constitutional:  [ ] fever [ ] chills  [ ] weight loss  [ ] weakness  Skin:  [ ] rash [ ] phlebitis	  Eyes: [ ] icterus [ ] pain  [ ] discharge	  ENMT: [ ] sore throat  [ ] thrush [ ] ulcers [ ] exudates  Respiratory: [ ] dyspnea [ ] hemoptysis [ ] cough [ ] sputum	  Cardiovascular:  [ ] chest pain [ ] palpitations [ ] edema	  Gastrointestinal:  [ ] nausea [ ] vomiting [ ] diarrhea [ ] constipation [ ] pain	  Genitourinary:  [ ] dysuria [ ] frequency [ ] hematuria [ ] discharge [ ] flank pain  [ ] incontinence  Musculoskeletal:  [ ] myalgias [ ] arthralgias [ ] arthritis  [ ] back pain  Neurological:  [ ] headache [ ] seizures  [ ] confusion/altered mental status  Psychiatric:  [ ] anxiety [ ] depression	  Hematology/Lymphatics:  [ ] lymphadenopathy  Endocrine:  [ ] adrenal [ ] thyroid  Allergic/Immunologic:	 [ ] transplant [ ] seasonal    Vital Signs Last 24 Hrs  T(F): 99.1 (07-05-20 @ 14:33), Max: 103.4 (07-03-20 @ 02:57)  Vital Signs Last 24 Hrs  HR: 70 (07-05-20 @ 13:20) (62 - 79)  BP: 96/60 (07-05-20 @ 13:20) (96/60 - 152/73)  RR: 18 (07-05-20 @ 13:20)  SpO2: 96% (07-05-20 @ 13:20) (88% - 99%)  Wt(kg): --    EXAM:  Constitutional: Not in acute distress.  Eyes: pupils bilaterally reactive to light. No icterus.  Oral cavity: Clear, no lesions  Neck: No neck vein distension noted. No neck stiffness  RS: Chest clear to auscultation bilaterally. No wheeze/rhonchi/crepitations.  CVS: S1, S2 heard. Regular rate and rhythm. No murmurs/rubs/gallops.  Abdomen: Soft. No guarding/rigidity/tenderness.  : No acute abnormalities  Extremities: Warm. No pedal edema  Skin: No lesions noted  Vascular: No evidence of phlebitis  Neuro: Alert, awake, responsive                          10.6   8.01  )-----------( 169      ( 05 Jul 2020 06:54 )             32.5     07-05    130<L>  |  96  |  15  ----------------------------<  131<H>  3.3<L>   |  21<L>  |  0.57    Ca    8.0<L>      05 Jul 2020 06:57  Phos  2.4     07-05  Mg     1.9     07-05    TPro  6.3  /  Alb  3.0<L>  /  TBili  0.4  /  DBili  x   /  AST  191<H>  /  ALT  97<H>  /  AlkPhos  76  07-05    MICROBIOLOGY:  Culture - Blood (collected 03 Jul 2020 09:59)  Source: .Blood Blood-Venous  Preliminary Report (04 Jul 2020 10:01):    No growth to date.    Culture - Blood (collected 03 Jul 2020 09:59)  Source: .Blood Blood-Peripheral  Preliminary Report (04 Jul 2020 10:01):    No growth to date.    Culture - Urine (collected 03 Jul 2020 03:11)  Source: .Urine Clean Catch (Midstream)  Final Report (04 Jul 2020 07:22):    <10,000 CFU/mL Normal Urogenital Nneka      Rapid RVP Result: NotDetec (07-04 @ 14:07)        RADIOLOGY:  imaging below personally reviewed  < from: CT Angio Chest w/ IV Cont (07.03.20 @ 23:02) >  IMPRESSION:   No pulmonary embolus.    < from: CT Abdomen and Pelvis w/ IV Cont (07.02.20 @ 21:50) >  IMPRESSION:   Etiology for patient's abdominal pain is not elucidated in this study.  < end of copied text >        OTHER TESTS:

## 2020-07-05 NOTE — CONSULT NOTE ADULT - ATTENDING COMMENTS
89F with PMH of HTN, T2DM, dementia (unclear baseline), [place] BIBEMS for weakness/fatigue. Pt is [place] and possible language barrier - tried to speak with pt via Mandarin/Cantonese  ([place] ID#[id number]) but  unable to understand pt; unable to obtain history from pt. Multiple calls made to emergency contacts in chart, unable to reach family, VMs left.   patient with fevers  found to have rhabdo  When seen awake-responds to queries no nuchal rigidity  ROS per RN with diarrhea  pt unable to communicate even with  phone as is hard of hearing  Still with fevers  Workup as above including cx, C diff, CT chest abd pelvis negative  LFTs improving  ? Viral   ?Gi pathology  ? fevers due to rhabdo    Rec:  A) fevers  Follow Cx  Check GI pCR  attempt de-escalation to ceftriaxone  If stable and workup negative will soon Dc abx  check dopplers r/o DVT      B) Rhabdo  improving  continue IVF  will defer to primary team     C) diarrhea  as above    Will tailor plan for ID issues  per course,results.Will defer to primary team on management of other issues.  Assessment, plan and recommendations as detailed above were discussed with the medical/primary  team.  Will Follow.  Beeper [phone] [place] [id number].   Wknd/afterhours/No response-[phone] or Fellow on call

## 2020-07-05 NOTE — PROGRESS NOTE ADULT - PROBLEM SELECTOR PLAN 4
- Noted AST/ALT 270s/120s with NML total bilirubin/Alk phos   - No tenderness elicited on abdominal exam  - CT A/P with no GB/liver pathology noted  - Etiology possibly 2/2 combination of sepsis and rhabdomyolysis  - F/u hepatitides panel, lipid panel  - If transaminitis continues to worsen, will obtain RUQ US - Noted AST/ALT 270s/120s with NML total bilirubin/Alk phos   - Improving to 190s/90s  - No tenderness elicited on abdominal exam  - CT A/P with no GB/liver pathology noted  - Etiology possibly 2/2 combination of sepsis and rhabdomyolysis  - Hepatitis and lipid panel negative  - If transaminitis continues to worsen, will obtain RUQ US

## 2020-07-05 NOTE — PROGRESS NOTE ADULT - ATTENDING COMMENTS
Pt seen at bedside. Pt presented with weakness, meeting SIRS criteria. Last febrile to 100.9 this am despite broad spectrum abx, although fever curve is down. No focal source of infection localized, CT chest and abdomen negative, cultures negative. Procalcitonin minorly elevated, COVID negative x2. Additionally, found to have rhabdo. Constellation of symptoms could be 2/2 infection (bacterial vs viral) vs medication side effect from Aricept (Has been linked to rhabdo, fever and NMS). RVP, HIV c diff negative. EBV, CMV pending given link to transaminitis, and rhabdo.   Will hold broad spectrum abx for now and consult ID given no clear bacterial source. Could symptomatology be 2/2 meningitis/encephalitis?    Will treat with supportive care for now for rhabdo. Off statin and Aricept. CPK downtrending off IVF. Will continue to trend daily for now Pt seen at bedside. Pt presented with weakness, meeting SIRS criteria. Last febrile to 100.9 this am despite broad spectrum abx, although fever curve is down. No focal source of infection localized, CT chest and abdomen negative, cultures negative. Procalcitonin minorly elevated, COVID negative x2. Additionally, found to have rhabdo. Constellation of symptoms could be 2/2 infection (bacterial vs viral) vs medication side effect from Aricept (Has been linked to rhabdo, fever and NMS). RVP, HIV c diff negative. EBV, CMV pending given link to transaminitis, and rhabdo.   Will hold broad spectrum abx for now and consult ID given no clear bacterial source.    Will treat with supportive care for now for rhabdo. Off statin and Aricept. CPK downtrending off IVF. Will continue to trend daily for now

## 2020-07-05 NOTE — CONSULT NOTE ADULT - ASSESSMENT
ASSESSMENT:    RECOMMENDATIONS:    JIMBO Rogel, MD  Fellow, Infectious Diseases  Pager: [phone]  If no response, after 5pm and on Weekends: Call [phone] 89/F with PMH HTN, T2DM, Alzheimer's dementia (unclear baseline), [place].  BIBEMS for weakness/fatigue. In ED, initially afebrile (febrile on floors Tmax 103.4). labs showed WBC 9290, with 10.4% bands, transaminitis and elevated CK (10,199).  Started on IV Vanc and Zosyn, continued to spike. ID team consulted for persistent fevers  =========  Fevers of unknown origin  - unclear etiology of bandemia, fevers, transaminitis, rhabdomyolysis. Rhabdomyolysis by itself can cause fevers. Other D/D is GI infection  - CT C/A/P showed no acute pathology (CT A/P done w/o PO contrast). Covid-19 negative, HIV NR, C. diff negative, RVP negative  - ESR and CRP elevated  - given h/o loose stools - would check GI PCR and cover with Ceftriaxone    RECOMMENDATIONS:  - start Ceftriaxone 1g/d IV  - check stool GI PCR  - f/u blood cx  Recs conveyed to primary team resident    JIMBO Rogel, MD  Fellow, Infectious Diseases  Pager: [phone]  If no response, after 5pm and on Weekends: Call [phone]

## 2020-07-05 NOTE — PROGRESS NOTE ADULT - PROBLEM SELECTOR PLAN 2
[place] [address] [place], 6806 on 7/3; Admission UA demonstrating "moderate blood" with 13-14 RBCs  - Unclear etiology as per HPI from nephew negative for falls, trauma or strenuous exertion; possibly due to infectious status;  - Statin held in context of rhabdo  - Aricept held due to concern of possible NMS (fevers, rhabdomyolysis)  - Trend CMP  - [place] [address] [place], 6806 on 7/3; Admission UA demonstrating "moderate blood" with 13-14 RBCs  - Unclear etiology as per HPI from nephew negative for falls, trauma or strenuous exertion; possibly due to infectious status  - Statin held in context of rhabdo  - Aricept held due to concern of possible NMS (fevers, rhabdomyolysis)  - Trend CMP  - [place] 134[address] on admission, 6806 on 7/3; Admission UA demonstrating "moderate blood" with 13-14 RBCs  - CK now 3094  - Unclear etiology as per HPI from nephew negative for falls, trauma or strenuous exertion; possibly due to infectious status  - Statin held in context of rhabdo  - Aricept held due to concern of possible NMS (fevers, rhabdomyolysis)  - Trend CMP  - Trend CK

## 2020-07-05 NOTE — PROVIDER CONTACT NOTE (OTHER) - ACTION/TREATMENT ORDERED:
MD aware. Will order albuterol; will administer and monitor pt closely. MD aware. MD Assessed pt at bedside. Will order albuterol; will administer and monitor pt closely.

## 2020-07-05 NOTE — PROGRESS NOTE ADULT - PROBLEM SELECTOR PLAN 7
- On admission -147  - Takes losartan 50 mg at home  - Will hold given infectious clinical picture and episodes of hypotension, will resume as appropriate - On admission -147  - Takes losartan 50 mg at home  - SBP currently 116-152  - Will hold given infectious clinical picture and episodes of hypotension, will resume as appropriate

## 2020-07-05 NOTE — PROVIDER CONTACT NOTE (OTHER) - ASSESSMENT
Alert; confused. VS WDL except temp. No resp distress or pain noted.
Alert; confused. VS WDL. No pain noted. Pt looks SOB; wheezing noted upon auscultation

## 2020-07-05 NOTE — PROGRESS NOTE ADULT - PROBLEM SELECTOR PLAN 6
- History of DM on glipizide XL 5 mg daily at home  - ISS  - Monitor finger sticks with goal 100-180   - F/u A1c - History of DM on glipizide XL 5 mg daily at home  - ISS  - Monitor finger sticks with goal 100-180   - A1c 6.6

## 2020-07-05 NOTE — PROGRESS NOTE ADULT - ASSESSMENT
89F with PMH of HTN, T2DM, dementia (unclear baseline), [place] BIBEMS for weakness/fatigue, found to be septic with unclear source, with laboratory evidence of rhabdomyolysis, with recurrent high degree fevers since admission while on broad spectrum antibiotics.

## 2020-07-05 NOTE — PROVIDER CONTACT NOTE (OTHER) - BACKGROUND
Admitted for weakness. PMH of Glaucoma, HLD, Diabetes, Dementia
Admitted for weakness. PMH of Glaucoma, HLD, Diabetes, Dementia

## 2020-07-06 LAB
ALBUMIN SERPL ELPH-MCNC: 2.9 G/DL — LOW (ref 3.3–5)
ALP SERPL-CCNC: 106 U/L — SIGNIFICANT CHANGE UP (ref 40–120)
ALT FLD-CCNC: 114 U/L — HIGH (ref 10–45)
ANION GAP SERPL CALC-SCNC: 10 MMOL/L — SIGNIFICANT CHANGE UP (ref 5–17)
AST SERPL-CCNC: 209 U/L — HIGH (ref 10–40)
BILIRUB SERPL-MCNC: 0.3 MG/DL — SIGNIFICANT CHANGE UP (ref 0.2–1.2)
BUN SERPL-MCNC: 10 MG/DL — SIGNIFICANT CHANGE UP (ref 7–23)
CALCIUM SERPL-MCNC: 8.1 MG/DL — LOW (ref 8.4–10.5)
CHLORIDE SERPL-SCNC: 102 MMOL/L — SIGNIFICANT CHANGE UP (ref 96–108)
CMV DNA CSF QL NAA+PROBE: SIGNIFICANT CHANGE UP
CMV DNA SPEC NAA+PROBE-LOG#: SIGNIFICANT CHANGE UP LOG10IU/ML
CO2 SERPL-SCNC: 25 MMOL/L — SIGNIFICANT CHANGE UP (ref 22–31)
CREAT SERPL-MCNC: 0.56 MG/DL — SIGNIFICANT CHANGE UP (ref 0.5–1.3)
EBV EA AB SER IA-ACNC: >150 U/ML — HIGH
EBV EA AB TITR SER IF: POSITIVE
EBV EA IGG SER-ACNC: POSITIVE
EBV NA IGG SER IA-ACNC: >600 U/ML — HIGH
EBV PATRN SPEC IB-IMP: SIGNIFICANT CHANGE UP
EBV VCA IGG AVIDITY SER QL IA: POSITIVE
EBV VCA IGM SER IA-ACNC: <10 U/ML — SIGNIFICANT CHANGE UP
EBV VCA IGM SER IA-ACNC: >750 U/ML — HIGH
EBV VCA IGM TITR FLD: NEGATIVE — SIGNIFICANT CHANGE UP
GLUCOSE SERPL-MCNC: 128 MG/DL — HIGH (ref 70–99)
HCT VFR BLD CALC: 30.9 % — LOW (ref 34.5–45)
HGB BLD-MCNC: 10.1 G/DL — LOW (ref 11.5–15.5)
MAGNESIUM SERPL-MCNC: 2 MG/DL — SIGNIFICANT CHANGE UP (ref 1.6–2.6)
MCHC RBC-ENTMCNC: 29.6 PG — SIGNIFICANT CHANGE UP (ref 27–34)
MCHC RBC-ENTMCNC: 32.7 GM/DL — SIGNIFICANT CHANGE UP (ref 32–36)
MCV RBC AUTO: 90.6 FL — SIGNIFICANT CHANGE UP (ref 80–100)
NRBC # BLD: 0 /100 WBCS — SIGNIFICANT CHANGE UP (ref 0–0)
PHOSPHATE SERPL-MCNC: 3.9 MG/DL — SIGNIFICANT CHANGE UP (ref 2.5–4.5)
PLATELET # BLD AUTO: 211 K/UL — SIGNIFICANT CHANGE UP (ref 150–400)
POTASSIUM SERPL-MCNC: 3.2 MMOL/L — LOW (ref 3.5–5.3)
POTASSIUM SERPL-SCNC: 3.2 MMOL/L — LOW (ref 3.5–5.3)
PROT SERPL-MCNC: 6.4 G/DL — SIGNIFICANT CHANGE UP (ref 6–8.3)
RBC # BLD: 3.41 M/UL — LOW (ref 3.8–5.2)
RBC # FLD: 14.2 % — SIGNIFICANT CHANGE UP (ref 10.3–14.5)
SODIUM SERPL-SCNC: 137 MMOL/L — SIGNIFICANT CHANGE UP (ref 135–145)
WBC # BLD: 6.6 K/UL — SIGNIFICANT CHANGE UP (ref 3.8–10.5)
WBC # FLD AUTO: 6.6 K/UL — SIGNIFICANT CHANGE UP (ref 3.8–10.5)

## 2020-07-06 PROCEDURE — 99233 SBSQ HOSP IP/OBS HIGH 50: CPT | Mod: GC

## 2020-07-06 PROCEDURE — 93970 EXTREMITY STUDY: CPT | Mod: 26

## 2020-07-06 PROCEDURE — 99232 SBSQ HOSP IP/OBS MODERATE 35: CPT

## 2020-07-06 RX ORDER — SODIUM CHLORIDE 9 MG/ML
1000 INJECTION, SOLUTION INTRAVENOUS
Refills: 0 | Status: DISCONTINUED | OUTPATIENT
Start: 2020-07-06 | End: 2020-07-07

## 2020-07-06 RX ORDER — POTASSIUM CHLORIDE 20 MEQ
10 PACKET (EA) ORAL
Refills: 0 | Status: COMPLETED | OUTPATIENT
Start: 2020-07-06 | End: 2020-07-06

## 2020-07-06 RX ORDER — POTASSIUM CHLORIDE 20 MEQ
40 PACKET (EA) ORAL EVERY 4 HOURS
Refills: 0 | Status: DISCONTINUED | OUTPATIENT
Start: 2020-07-06 | End: 2020-07-06

## 2020-07-06 RX ADMIN — Medication 1 PACKET(S): at 06:57

## 2020-07-06 RX ADMIN — Medication 20 MILLIEQUIVALENT(S): at 06:57

## 2020-07-06 RX ADMIN — MEMANTINE HYDROCHLORIDE 10 MILLIGRAM(S): 10 TABLET ORAL at 17:06

## 2020-07-06 RX ADMIN — Medication 3 MILLILITER(S): at 17:06

## 2020-07-06 RX ADMIN — Medication 100 MILLIEQUIVALENT(S): at 15:21

## 2020-07-06 RX ADMIN — SODIUM CHLORIDE 50 MILLILITER(S): 9 INJECTION, SOLUTION INTRAVENOUS at 17:05

## 2020-07-06 RX ADMIN — Medication 1: at 14:02

## 2020-07-06 RX ADMIN — Medication 3 MILLILITER(S): at 05:49

## 2020-07-06 RX ADMIN — Medication 100 MILLIEQUIVALENT(S): at 16:21

## 2020-07-06 RX ADMIN — Medication 100 MILLIEQUIVALENT(S): at 17:05

## 2020-07-06 RX ADMIN — ENOXAPARIN SODIUM 30 MILLIGRAM(S): 100 INJECTION SUBCUTANEOUS at 11:18

## 2020-07-06 RX ADMIN — MEMANTINE HYDROCHLORIDE 10 MILLIGRAM(S): 10 TABLET ORAL at 06:57

## 2020-07-06 RX ADMIN — Medication 3 MILLILITER(S): at 11:01

## 2020-07-06 NOTE — PROGRESS NOTE ADULT - SUBJECTIVE AND OBJECTIVE BOX
Contact Information:  Michaela Peralta DO  PGY-1, Internal Medicine  Pager: [phone]    AUGUST DANIELS, MRN-[id number]    Patient is a 89y old  Female who presents with a chief complaint of weakness, fatigue (04 Jul 2020 23:11)    OVERNIGHT EVENTS: spiked fever of 100.9F overnight, resolved with Tylenol     SUBJECTIVE: Patient evaluated at bedside, unable to obtain ROS due to mental status (dementia), [place], language barrier. Spoke with nurses which endorse patient has cough and some wheezing that began over the weekend.    ROS - unable to obtain.      OBJECTIVE:  ICU Vital Signs Last 24 Hrs  T(C): 37.7 (06 Jul 2020 05:25), Max: 38.3 (05 Jul 2020 11:04)  T(F): 99.9 (06 Jul 2020 05:25), Max: 100.9 (05 Jul 2020 11:04)  HR: 73 (06 Jul 2020 05:49) (70 - 86)  BP: 138/84 (06 Jul 2020 05:25) (96/60 - 138/84)  BP(mean): --  ABP: --  ABP(mean): --  RR: 20 (06 Jul 2020 05:25) (18 - 21)  SpO2: 100% (06 Jul 2020 05:49) (94% - 100%)      MEDICATIONS  (STANDING):  dextrose 5%. 1000 milliLiter(s) (50 mL/Hr) IV Continuous <Continuous>  dextrose 50% Injectable 12.5 Gram(s) IV Push once  dextrose 50% Injectable 25 Gram(s) IV Push once  dextrose 50% Injectable 25 Gram(s) IV Push once  enoxaparin Injectable 30 milliGRAM(s) SubCutaneous daily  insulin lispro (HumaLOG) corrective regimen sliding scale   SubCutaneous three times a day before meals  insulin lispro (HumaLOG) corrective regimen sliding scale   SubCutaneous at bedtime  lactated ringers. 1000 milliLiter(s) (125 mL/Hr) IV Continuous <Continuous>  memantine 10 milliGRAM(s) Oral two times a day  piperacillin/tazobactam IVPB.. 3.375 Gram(s) IV Intermittent every 8 hours  vancomycin  IVPB 1000 milliGRAM(s) IV Intermittent every 24 hours    MEDICATIONS  (PRN):  dextrose 40% Gel 15 Gram(s) Oral once PRN Blood Glucose LESS THAN 70 milliGRAM(s)/deciliter  glucagon  Injectable 1 milliGRAM(s) IntraMuscular once PRN Glucose LESS THAN 70 milligrams/deciliter    Allergies    No Known Allergies    Intolerances    CONSTITUTIONAL: Awake and alert but with minimal capacity for meaningful interaction.  RESPIRATORY: decreased breath sounds (poor respiratory effort made), No accessory muscle use.  CARDIOVASCULAR: +S1/S2. No audible S3/S4. Regular rate and rhythm. No murmurs, rubs, or gallops.   GASTROINTESTINAL: Soft, nondistended. Palpation of ABD elicits no grimacing or withdrawal. +BS. No rebound or guarding.   EXTREMITY: No LE swelling or edema.  NEUROLOGICAL: Unable to assess due to mental status.    07-06    137  |  102  |  10  ----------------------------<  128<H>  3.2<L>   |  25  |  0.56    Ca    8.1<L>      06 Jul 2020 06:28  Phos  3.9     07-06  Mg     2.0     07-06    TPro  6.4  /  Alb  2.9<L>  /  TBili  0.3  /  DBili  x   /  AST  209<H>  /  ALT  114<H>  /  AlkPhos  106  07-06        CAPILLARY BLOOD GLUCOSE      POCT Blood Glucose.: 150 mg/dL (06 Jul 2020 08:32)  POCT Blood Glucose.: 121 mg/dL (05 Jul 2020 22:46)  POCT Blood Glucose.: 125 mg/dL (05 Jul 2020 17:39)  POCT Blood Glucose.: 141 mg/dL (05 Jul 2020 12:12)    LIVER FUNCTIONS - ( 06 Jul 2020 06:28 )  Alb: 2.9 g/dL / Pro: 6.4 g/dL / ALK PHOS: 106 U/L / ALT: 114 U/L / AST: 209 U/L / GGT: x           CARDIAC MARKERS ( 05 Jul 2020 06:57 )  x     / x     / 3094 U/L / x     / x          Culture - Blood (collected 03 Jul 2020 09:59)  Source: .Blood Blood-Venous  Preliminary Report (04 Jul 2020 10:01):    No growth to date.    Culture - Urine (collected 03 Jul 2020 03:11)  Source: .Urine Clean Catch (Midstream)  Final Report (04 Jul 2020 07:22):    <10,000 CFU/mL Normal Urogenital Nneka          RADIOLOGY AND ADDITIONAL TESTS:    CONSULTANT NOTES REVIEWED:    CARE DISCUSSED WITH THE FOLLOWING CONSULTANTS/PROVIDERS:

## 2020-07-06 NOTE — PROGRESS NOTE ADULT - PROBLEM SELECTOR PLAN 2
[place] [address] [place], 6806 on 7/3; Admission UA demonstrating "moderate blood" with 13-14 RBCs  - CK now 3094  - Unclear etiology as per HPI from nephew negative for falls, trauma or strenuous exertion; possibly due to infectious status  - Statin held in context of rhabdo  - Aricept held due to concern of possible NMS (fevers, rhabdomyolysis)  - Trend CMP  - Trend CK

## 2020-07-06 NOTE — PROVIDER CONTACT NOTE (CHANGE IN STATUS NOTIFICATION) - ACTION/TREATMENT ORDERED:
will come to unit to assess pt.
will order tylenol 650mg PO suspension x1
pt given PRN tylenol 650mg PO will repeat temp.
pt placed pt on 2Lnc with improvement. O2 sat increased to 99% rr20 pt resting more comfortably. Md came to unit to assess pt.

## 2020-07-06 NOTE — PROGRESS NOTE ADULT - PROBLEM SELECTOR PLAN 3
- Presented w/ 1 day of weakness and fatigue, previously ambulatory and more active  - Low suspicion for iatrogenic causes given current home meds not known to cause patient's constellation of symptoms  - TSH, vitamin levels WNL  - Infectious w/u largely negative  - Will trend CMP  - PT consult pending

## 2020-07-06 NOTE — PROGRESS NOTE ADULT - PROBLEM SELECTOR PLAN 1
- On admissions, bandemia 10.4% + recurrent fever without clear source, unable to elicit potential source from patient given mental status  - UA (-), CXR (-), CT A/P without acute pathology; COVID PCR x 2 and antibodies (-)   - CTA chest non-revealing  - Bcx, Ucx negative  - F/u EBV, CMV; RVP, HIV negative  - Patient noted to have loose stools; C. Diff negative  - Monitor vital signs for fever  - Tylenol PRN with awareness of possible liver toxicity (though LFTs downtrending)  - ID consulted, f/u GI PCR and start ceftriaxone; if GI PCR negative, can stop CTX and monitor off Abx

## 2020-07-06 NOTE — PROGRESS NOTE ADULT - PROBLEM SELECTOR PLAN 5
- Alzheimer's type. On admission, patient minimally communicative, likely combination of being hard of hearing, with foreign tongue (Mandarin-speaking), with dementia  - Per family, patient is ambulatory at baseline and usually aware of self, occasionally aware of place and time  - C/w memantine  - Donepezil held due to link to fever, rhabdo

## 2020-07-06 NOTE — PROGRESS NOTE ADULT - PROBLEM SELECTOR PLAN 4
- Noted AST/ALT 270s/120s with NML total bilirubin/Alk phos   - Improving to 190s/90s  - No tenderness elicited on abdominal exam  - CT A/P with no GB/liver pathology noted  - Etiology possibly 2/2 combination of sepsis and rhabdomyolysis  - Hepatitis and lipid panel negative  - If transaminitis continues to worsen, will obtain RUQ US

## 2020-07-06 NOTE — PROGRESS NOTE ADULT - ASSESSMENT
89F with PMH of HTN, T2DM, dementia (unclear baseline), [place] BIBEMS for weakness/fatigue, found to be septic with unclear source, with laboratory evidence of rhabdomyolysis, with recurrent high degree fevers since admission while on broad spectrum antibiotics.    - CXR to r/o pneumonia 2/2 new onset cough and wheezes  -  f/u GI PCR   - f/u CPK to see if need fluid vs. bolus, trending down  - f/u ID recs regarding abx since currently unknown infxn  - monitor transaminases, elevated

## 2020-07-06 NOTE — PROGRESS NOTE ADULT - ATTENDING COMMENTS
pt seen and examined.  above plan discussed on rounds today.  In addition,    Rhabdo - c/w gentle IV fluid hydration  Fevers - pt found to have an acute DVT, will monitor off of abx. ID f/u appreciated

## 2020-07-06 NOTE — PROGRESS NOTE ADULT - PROBLEM SELECTOR PLAN 7
- On admission -147  - Takes losartan 50 mg at home  - SBP currently 116-152  - Will hold given infectious clinical picture and episodes of hypotension, will resume as appropriate

## 2020-07-06 NOTE — PROGRESS NOTE ADULT - PROBLEM SELECTOR PLAN 6
- History of DM on glipizide XL 5 mg daily at home  - ISS  - Monitor finger sticks with goal 100-180   - A1c 6.6

## 2020-07-06 NOTE — PROGRESS NOTE ADULT - ASSESSMENT
89F with PMH of HTN, T2DM, dementia (unclear baseline), [place] BIBEMS for weakness/fatigue. Pt is [place] and possible language barrier - tried to speak with pt via Mandarin/Cantonese  ([facility] ID#[id number]) but  unable to understand pt; unable to obtain history from pt. Multiple calls made to emergency contacts in chart, unable to reach family, VMs left.   patient with fevers  found to have rhabdo  When seen awake-responds to queries no nuchal rigidity  pt unable to communicate even with  phone as is hard of hearing  fevers resolved  workup for infectious etiology so far negative  DVT on imaging  still elevated LFTS      Rec:  A) fevers  Follow Cx  Check GI pCR  attempt de-escalation to ceftriaxone  If stable and workup negative will soon Dc abx  check dopplers r/o DVT      B) Rhabdo  improving  continue IVF  will defer to primary team     C) diarrhea  as above    Will tailor plan for ID issues  per course,results.Will defer to primary team on management of other issues.  Assessment, plan and recommendations as detailed above were discussed with the medical/primary  team.  Will Follow.  Beeper [id number] [facility] [id number].   Wknd/afterhours/No response-[phone] or Fellow on call . 89F with PMH of HTN, T2DM, dementia (unclear baseline), [place] BIBEMS for weakness/fatigue. Pt is [place] and possible language barrier - tried to speak with pt via Mandarin/Cantonese  ([place] ID#[id number]) but  unable to understand pt; unable to obtain history from pt. Multiple calls made to emergency contacts in chart, unable to reach family, VMs left.   patient with fevers  found to have rhabdo  When seen awake-responds to queries no nuchal rigidity  pt unable to communicate even with  phone as is hard of hearing  fevers resolved  workup for infectious etiology so far negative  DVT on imaging  still elevated LFTS      Rec:  A) fevers  ? from DVt vs rhabdo  workup for infectious etiology negative  Dc ceftriaxone and observe  DVT per primary team         B) Rhabdo  improving  continue IVF  will defer to primary team     C) diarrhea  as above    Will tailor plan for ID issues  per course,results.Will defer to primary team on management of other issues.  Assessment, plan and recommendations as detailed above were discussed with the medical/primary  team.  Will Follow.  Beeper [id number] [facility] [id number].   Wknd/afterhours/No response-[phone] or Fellow on call .

## 2020-07-06 NOTE — PROVIDER CONTACT NOTE (CHANGE IN STATUS NOTIFICATION) - SITUATION
/76 p90 rectal T102 rr19 pOx 100
bp92/56 Rectal temp 39.6 hr84 [id number] rr18  pt returned from CT, had small bm awake and alert
bp 136/67 hr71 [id number] rr20 pOx 96
pt rr22 O2 sat 88% pt wheezing with rhonchii,

## 2020-07-06 NOTE — PROGRESS NOTE ADULT - SUBJECTIVE AND OBJECTIVE BOX
Patient is a 89y old  Female who presents with a chief complaint of weakness, fatigue (06 Jul 2020 10:49)    Being followed by ID for fever    Interval history:pt with limited ROS  no observed cough no observed diarrhea  No acute events      ROS:  as above     Antimicrobials:  ceftriaxone Dced    Other medications reviewed    Vital Signs Last 24 Hrs  T(C): 37.4 (07-06-20 @ 14:27), Max: 38.3 (07-05-20 @ 23:47)  T(F): 99.4 (07-06-20 @ 14:27), Max: 100.9 (07-05-20 @ 23:47)  HR: 8 (07-06-20 @ 14:27) (8 - 86)  BP: 128/77 (07-06-20 @ 14:27) (104/61 - 138/84)  BP(mean): --  RR: 20 (07-06-20 @ 14:27) (18 - 21)  SpO2: 96% (07-06-20 @ 14:27) (94% - 100%)    Physical Exam:        HEENT PERRLA     No oral exudate or erythema    Chest Good AE,CTA    CVS RRR S1 S2 WNl ESM no rub or gallop    Abd soft BS normal No tenderness no masses    IV site no erythema tenderness or discharge    CNS alert awake-responds to soem queries but [place] and even with  not answering questions     Lab Data:                          10.1   6.60  )-----------( 211      ( 06 Jul 2020 06:28 )             30.9       07-06    137  |  102  |  10  ----------------------------<  128<H>  3.2<L>   |  25  |  0.56    Ca    8.1<L>      06 Jul 2020 06:28  Phos  3.9     07-06  Mg     2.0     07-06    TPro  6.4  /  Alb  2.9<L>  /  TBili  0.3  /  DBili  x   /  AST  209<H>  /  ALT  114<H>  /  AlkPhos  106  07-06        Culture - Blood (collected 03 Jul 2020 09:59)  Source: .Blood Blood-Venous  Preliminary Report (04 Jul 2020 10:01):    No growth to date.    Culture - Blood (collected 03 Jul 2020 09:59)  Source: .Blood Blood-Peripheral  Preliminary Report (04 Jul 2020 10:01):    No growth to date.    Culture - Urine (collected 03 Jul 2020 03:11)  Source: .Urine Clean Catch (Midstream)  Final Report (04 Jul 2020 07:22):    <10,000 CFU/mL Normal Urogenital Nneka        C Diff by PCR Result: NotDetec (07-04-20 @ 20:08)        < from: [facility] Duplex Lower Ext Vein Scan, Bilat (07.06.20 @ 11:04) >    IMPRESSION:     There is acute right soleal vein DVT in the calf.    Dr. Malathi Dsouza notified.        < end of copied text >      < from: CT Angio Chest w/ IV Cont (07.03.20 @ 23:02) >    IMPRESSION:   No pulmonary embolus.        < end of copied text > Patient is a 89y old  Female who presents with a chief complaint of weakness, fatigue (06 Jul 2020 10:49)    Being followed by ID for fever    Interval history:pt with limited ROS  no observed cough no observed diarrhea  unable to collect sample for GI PCR   No acute events      ROS:  as above     Antimicrobials:  ceftriaxone Dced    Other medications reviewed    Vital Signs Last 24 Hrs  T(C): 37.4 (07-06-20 @ 14:27), Max: 38.3 (07-05-20 @ 23:47)  T(F): 99.4 (07-06-20 @ 14:27), Max: 100.9 (07-05-20 @ 23:47)  HR: 8 (07-06-20 @ 14:27) (8 - 86)  BP: 128/77 (07-06-20 @ 14:27) (104/61 - 138/84)  BP(mean): --  RR: 20 (07-06-20 @ 14:27) (18 - 21)  SpO2: 96% (07-06-20 @ 14:27) (94% - 100%)    Physical Exam:        HEENT PERRLA     No oral exudate or erythema    Chest Good AE,CTA    CVS RRR S1 S2 WNl ESM no rub or gallop    Abd soft BS normal No tenderness no masses    IV site no erythema tenderness or discharge    CNS alert awake-responds to soem queries but [place] and even with  not answering questions     Lab Data:                          10.1   6.60  )-----------( 211      ( 06 Jul 2020 06:28 )             30.9       07-06    137  |  102  |  10  ----------------------------<  128<H>  3.2<L>   |  25  |  0.56    Ca    8.1<L>      06 Jul 2020 06:28  Phos  3.9     07-06  Mg     2.0     07-06    TPro  6.4  /  Alb  2.9<L>  /  TBili  0.3  /  DBili  x   /  AST  209<H>  /  ALT  114<H>  /  AlkPhos  106  07-06        Culture - Blood (collected 03 Jul 2020 09:59)  Source: .Blood Blood-Venous  Preliminary Report (04 Jul 2020 10:01):    No growth to date.    Culture - Blood (collected 03 Jul 2020 09:59)  Source: .Blood Blood-Peripheral  Preliminary Report (04 Jul 2020 10:01):    No growth to date.    Culture - Urine (collected 03 Jul 2020 03:11)  Source: .Urine Clean Catch (Midstream)  Final Report (04 Jul 2020 07:22):    <10,000 CFU/mL Normal Urogenital Nneka        C Diff by PCR Result: NotDetec (07-04-20 @ 20:08)        < from: [facility] Duplex Lower Ext Vein Scan, Bilat (07.06.20 @ 11:04) >    IMPRESSION:     There is acute right soleal vein DVT in the calf.    Dr. Malathi Dsouza notified.        < end of copied text >      < from: CT Angio Chest w/ IV Cont (07.03.20 @ 23:02) >    IMPRESSION:   No pulmonary embolus.        < end of copied text >

## 2020-07-07 DIAGNOSIS — I82.409 ACUTE EMBOLISM AND THROMBOSIS OF UNSPECIFIED DEEP VEINS OF UNSPECIFIED LOWER EXTREMITY: ICD-10-CM

## 2020-07-07 LAB
ALBUMIN SERPL ELPH-MCNC: 3.2 G/DL — LOW (ref 3.3–5)
ALP SERPL-CCNC: 122 U/L — HIGH (ref 40–120)
ALT FLD-CCNC: 125 U/L — HIGH (ref 10–45)
ANION GAP SERPL CALC-SCNC: 14 MMOL/L — SIGNIFICANT CHANGE UP (ref 5–17)
AST SERPL-CCNC: 189 U/L — HIGH (ref 10–40)
BASOPHILS # BLD AUTO: 0.05 K/UL — SIGNIFICANT CHANGE UP (ref 0–0.2)
BASOPHILS NFR BLD AUTO: 0.6 % — SIGNIFICANT CHANGE UP (ref 0–2)
BILIRUB SERPL-MCNC: 0.4 MG/DL — SIGNIFICANT CHANGE UP (ref 0.2–1.2)
BUN SERPL-MCNC: 11 MG/DL — SIGNIFICANT CHANGE UP (ref 7–23)
CALCIUM SERPL-MCNC: 8.3 MG/DL — LOW (ref 8.4–10.5)
CHLORIDE SERPL-SCNC: 99 MMOL/L — SIGNIFICANT CHANGE UP (ref 96–108)
CK SERPL-CCNC: 367 U/L — HIGH (ref 25–170)
CO2 SERPL-SCNC: 23 MMOL/L — SIGNIFICANT CHANGE UP (ref 22–31)
CREAT SERPL-MCNC: 0.49 MG/DL — LOW (ref 0.5–1.3)
EOSINOPHIL # BLD AUTO: 0.01 K/UL — SIGNIFICANT CHANGE UP (ref 0–0.5)
EOSINOPHIL NFR BLD AUTO: 0.1 % — SIGNIFICANT CHANGE UP (ref 0–6)
GLUCOSE SERPL-MCNC: 157 MG/DL — HIGH (ref 70–99)
HCT VFR BLD CALC: 33.7 % — LOW (ref 34.5–45)
HGB BLD-MCNC: 11.1 G/DL — LOW (ref 11.5–15.5)
IMM GRANULOCYTES NFR BLD AUTO: 1.3 % — SIGNIFICANT CHANGE UP (ref 0–1.5)
LYMPHOCYTES # BLD AUTO: 2.04 K/UL — SIGNIFICANT CHANGE UP (ref 1–3.3)
LYMPHOCYTES # BLD AUTO: 22.8 % — SIGNIFICANT CHANGE UP (ref 13–44)
MAGNESIUM SERPL-MCNC: 2 MG/DL — SIGNIFICANT CHANGE UP (ref 1.6–2.6)
MCHC RBC-ENTMCNC: 29.8 PG — SIGNIFICANT CHANGE UP (ref 27–34)
MCHC RBC-ENTMCNC: 32.9 GM/DL — SIGNIFICANT CHANGE UP (ref 32–36)
MCV RBC AUTO: 90.6 FL — SIGNIFICANT CHANGE UP (ref 80–100)
MONOCYTES # BLD AUTO: 0.89 K/UL — SIGNIFICANT CHANGE UP (ref 0–0.9)
MONOCYTES NFR BLD AUTO: 10 % — SIGNIFICANT CHANGE UP (ref 2–14)
NEUTROPHILS # BLD AUTO: 5.82 K/UL — SIGNIFICANT CHANGE UP (ref 1.8–7.4)
NEUTROPHILS NFR BLD AUTO: 65.2 % — SIGNIFICANT CHANGE UP (ref 43–77)
NRBC # BLD: 0 /100 WBCS — SIGNIFICANT CHANGE UP (ref 0–0)
PHOSPHATE SERPL-MCNC: 2.5 MG/DL — SIGNIFICANT CHANGE UP (ref 2.5–4.5)
PLATELET # BLD AUTO: 233 K/UL — SIGNIFICANT CHANGE UP (ref 150–400)
POTASSIUM SERPL-MCNC: 3.5 MMOL/L — SIGNIFICANT CHANGE UP (ref 3.5–5.3)
POTASSIUM SERPL-SCNC: 3.5 MMOL/L — SIGNIFICANT CHANGE UP (ref 3.5–5.3)
PROT SERPL-MCNC: 6.8 G/DL — SIGNIFICANT CHANGE UP (ref 6–8.3)
RBC # BLD: 3.72 M/UL — LOW (ref 3.8–5.2)
RBC # FLD: 14.6 % — HIGH (ref 10.3–14.5)
SODIUM SERPL-SCNC: 136 MMOL/L — SIGNIFICANT CHANGE UP (ref 135–145)
WBC # BLD: 8.93 K/UL — SIGNIFICANT CHANGE UP (ref 3.8–10.5)
WBC # FLD AUTO: 8.93 K/UL — SIGNIFICANT CHANGE UP (ref 3.8–10.5)

## 2020-07-07 PROCEDURE — 99232 SBSQ HOSP IP/OBS MODERATE 35: CPT

## 2020-07-07 PROCEDURE — 71045 X-RAY EXAM CHEST 1 VIEW: CPT | Mod: 26

## 2020-07-07 PROCEDURE — 99232 SBSQ HOSP IP/OBS MODERATE 35: CPT | Mod: GC

## 2020-07-07 RX ORDER — LOSARTAN POTASSIUM 100 MG/1
50 TABLET, FILM COATED ORAL DAILY
Refills: 0 | Status: DISCONTINUED | OUTPATIENT
Start: 2020-07-07 | End: 2020-07-08

## 2020-07-07 RX ADMIN — Medication 3 MILLILITER(S): at 00:45

## 2020-07-07 RX ADMIN — Medication 3 MILLILITER(S): at 12:25

## 2020-07-07 RX ADMIN — MEMANTINE HYDROCHLORIDE 10 MILLIGRAM(S): 10 TABLET ORAL at 18:14

## 2020-07-07 RX ADMIN — MEMANTINE HYDROCHLORIDE 10 MILLIGRAM(S): 10 TABLET ORAL at 05:40

## 2020-07-07 RX ADMIN — Medication 3 MILLILITER(S): at 05:50

## 2020-07-07 RX ADMIN — LOSARTAN POTASSIUM 50 MILLIGRAM(S): 100 TABLET, FILM COATED ORAL at 18:15

## 2020-07-07 RX ADMIN — ENOXAPARIN SODIUM 30 MILLIGRAM(S): 100 INJECTION SUBCUTANEOUS at 12:06

## 2020-07-07 NOTE — PROGRESS NOTE ADULT - PROBLEM SELECTOR PLAN 6
- Presented w/ 1 day of weakness /fatigue, previously ambulatory and more active  - Low suspicion for iatrogenic causes given current home meds not known to cause patient's constellation of symptoms  - TSH, vitamin levels WNL  - Infectious w/u largely negative  - Will trend CMP  - PT consult pending

## 2020-07-07 NOTE — PROGRESS NOTE ADULT - PROBLEM SELECTOR PLAN 2
AST/ALT 270s/120s with NML total bilirubin/Alk phos terrance no pain on abd exam  - US w duplex ordered to view hepatic vasculature 7/7  - possibly 2/2 combination of sepsis and rhabdomyolysis

## 2020-07-07 NOTE — PROGRESS NOTE ADULT - SUBJECTIVE AND OBJECTIVE BOX
Patient is a 89y old  Female who presents with a chief complaint of weakness, fatigue (06 Jul 2020 14:48)    Being followed by ID for fever,DVT, diarrhea    Interval history:awake  unable to comprehend any  questions  No acute events      ROS:  No reliable ROS  No observed cough or diarrhea      Antimicrobials:      Other medications reviewed    Vital Signs Last 24 Hrs  T(C): 36.8 (07-07-20 @ 05:02), Max: 37.5 (07-07-20 @ 01:01)  T(F): 98.2 (07-07-20 @ 05:02), Max: 99.5 (07-07-20 @ 01:01)  HR: 67 (07-07-20 @ 05:52) (8 - 88)  BP: 155/77 (07-07-20 @ 06:12) (126/61 - 168/81)  BP(mean): --  RR: 20 (07-07-20 @ 05:02) (18 - 20)  SpO2: 100% (07-07-20 @ 05:52) (95% - 100%)    Physical Exam:        HEENT PERRLA     No oral exudate or erythema    Chest Good AE,CTA    CVS RRR S1 S2 WNl ESM no rub or gallop    Abd soft BS normal No tenderness no masses    IV site no erythema tenderness or discharge    CNS alert awake-responds to soem queries but [place] and even with  not answering questions   Lab Data:                          11.1   8.93  )-----------( 233      ( 07 Jul 2020 07:16 )             33.7       07-07    136  |  99  |  11  ----------------------------<  157<H>  3.5   |  23  |  0.49<L>    Ca    8.3<L>      07 Jul 2020 07:16  Phos  2.5     07-07  Mg     2.0     07-07    TPro  6.8  /  Alb  3.2<L>  /  TBili  0.4  /  DBili  x   /  AST  189<H>  /  ALT  125<H>  /  AlkPhos  122<H>  07-07        Culture - Blood (collected 03 Jul 2020 09:59)  Source: .Blood Blood-Venous  Preliminary Report (04 Jul 2020 10:01):    No growth to date.    Culture - Blood (collected 03 Jul 2020 09:59)  Source: .Blood Blood-Peripheral  Preliminary Report (04 Jul 2020 10:01):    No growth to date.    Culture - Urine (collected 03 Jul 2020 03:11)  Source: .Urine Clean Catch (Midstream)  Final Report (04 Jul 2020 07:22):    <10,000 CFU/mL Normal Urogenital Nneka        C Diff by PCR Result: NotDetec (07-04-20 @ 20:08)      < from: [facility] Duplex Lower Ext Vein Scan, Bilat (07.06.20 @ 11:04) >    IMPRESSION:     There is acute right soleal vein DVT in the calf.    Dr. Malathi Dsouza notified.              < end of copied text >      < from: CT Angio Chest w/ IV Cont (07.03.20 @ 23:02) >  IMPRESSION:   No pulmonary embolus.          < end of copied text >

## 2020-07-07 NOTE — PROGRESS NOTE ADULT - ASSESSMENT
89F with PMH of HTN, T2DM, dementia (unclear baseline), [place] BIBEMS for weakness/fatigue. Pt is [place] and possible language barrier - tried to speak with pt via Mandarin/Cantonese  ([facility]#[id number]) but  unable to understand pt; unable to obtain history from pt. Multiple calls made to emergency contacts in chart, unable to reach family, VMs left.   patient with fevers  found to have rhabdo  When seen awake-responds to queries no nuchal rigidity  pt unable to communicate even with  phone as is hard of hearing  fevers resolved  workup for infectious etiology so far negative  DVT on imaging  still elevated LFTS though slight improvement       Rec:  A) fevers  ? from DVt vs rhabdo  workup for infectious etiology negative  stable off abx  DVT per primary team         B) Rhabdo  improving  continue IVF  will defer to primary team     C) diarrhea  as above    Will tailor plan for ID issues  per course,results.Will defer to primary team on management of other issues.  Assessment, plan and recommendations as detailed above were discussed with the medical/primary  team.  I am away tomorrow.My colleagues in ID service will cover .Please call [phone] if any issues or questions.

## 2020-07-07 NOTE — PROGRESS NOTE ADULT - PROBLEM SELECTOR PLAN 4
CK 39455 on admission; unclear etiology as per HPI from nephew negative for falls, trauma or strenuous exertion; possibly due to infectious status  - Statin held in context of rhabdo  - Aricept held 2/2 concern of possible NMS (fevers, rhabdomyolysis)  - Trend CMP  - Trend CK

## 2020-07-07 NOTE — PROGRESS NOTE ADULT - PROBLEM SELECTOR PLAN 1
acute R LE soleal vein found on US 7/6/2020 which may be cause of fever   - since below the knee does not require treatment   - requires weekly US of LE (schedule for D/C)

## 2020-07-07 NOTE — PROGRESS NOTE ADULT - ASSESSMENT
89F with PMH of HTN, T2DM, dementia (unclear baseline), [place] BIBEMS for weakness/fatigue, found to be septic with unclear source, with laboratory evidence of rhabdomyolysis, with recurrent high degree fevers since admission while on broad spectrum antibiotics. DVT below the knee was identified and likely source for fever. Antibiotics were D/Jack and CPK is trending downwards.

## 2020-07-07 NOTE — PROGRESS NOTE ADULT - ATTENDING COMMENTS
pt seen and examined.  above plan discussed on rounds today.  In addition,    Rhabdo - resolving. can stop IV fluids  Fevers - pt found to have an acute DVT, will monitor off of abx. ID f/u appreciated  Dementia - I attempted to communicate with the patient with both a Mandarin and a Cantonese  and both were unsuccessful at communicating with the patient.

## 2020-07-07 NOTE — PROGRESS NOTE ADULT - SUBJECTIVE AND OBJECTIVE BOX
PROGRESS NOTE:   Authored by Michaela Peralta DO PGY1 Pager [phone] [facility]    Patient is a 89y old  Female who presents with a chief complaint of weakness, fatigue (07 Jul 2020 12:12)      SUBJECTIVE / OVERNIGHT EVENTS: NOY  Unable to attain history from patient directly given dementia, language barrier, hearing loss. No changes per nurses    ADDITIONAL REVIEW OF SYSTEMS: As above.    MEDICATIONS  (STANDING):  dextrose 5%. 1000 milliLiter(s) (50 mL/Hr) IV Continuous <Continuous>  dextrose 50% Injectable 12.5 Gram(s) IV Push once  dextrose 50% Injectable 25 Gram(s) IV Push once  dextrose 50% Injectable 25 Gram(s) IV Push once  enoxaparin Injectable 30 milliGRAM(s) SubCutaneous daily  insulin lispro (HumaLOG) corrective regimen sliding scale   SubCutaneous three times a day before meals  insulin lispro (HumaLOG) corrective regimen sliding scale   SubCutaneous at bedtime  losartan 50 milliGRAM(s) Oral daily  memantine 10 milliGRAM(s) Oral two times a day  tiotropium 18 MICROgram(s) Capsule 1 Capsule(s) Inhalation daily    MEDICATIONS  (PRN):  acetaminophen    Suspension .. 650 milliGRAM(s) Oral every 6 hours PRN Temp greater or equal to 38C (100.4F)  dextrose 40% Gel 15 Gram(s) Oral once PRN Blood Glucose LESS THAN 70 milliGRAM(s)/deciliter  glucagon  Injectable 1 milliGRAM(s) IntraMuscular once PRN Glucose LESS THAN 70 milligrams/deciliter      CAPILLARY BLOOD GLUCOSE      POCT Blood Glucose.: 119 mg/dL (07 Jul 2020 13:50)  POCT Blood Glucose.: 113 mg/dL (07 Jul 2020 10:06)  POCT Blood Glucose.: 130 mg/dL (06 Jul 2020 21:31)  POCT Blood Glucose.: 117 mg/dL (06 Jul 2020 16:58)    I&O's Summary    06 Jul 2020 07:01  -  07 Jul 2020 07:00  --------------------------------------------------------  IN: 880 mL / OUT: 0 mL / NET: 880 mL    07 Jul 2020 07:01  -  07 Jul 2020 16:46  --------------------------------------------------------  IN: 240 mL / OUT: 0 mL / NET: 240 mL        PHYSICAL EXAM:  Vital Signs Last 24 Hrs  T(C): 37.1 (07 Jul 2020 10:00), Max: 37.5 (07 Jul 2020 01:01)  T(F): 98.8 (07 Jul 2020 10:00), Max: 99.5 (07 Jul 2020 01:01)  HR: 65 (07 Jul 2020 12:27) (63 - 88)  BP: 141/77 (07 Jul 2020 10:00) (126/61 - 168/81)  BP(mean): --  RR: 20 (07 Jul 2020 10:00) (18 - 20)  SpO2: 98% (07 Jul 2020 12:27) (95% - 100%)    GENERAL: No acute distress, well-developed  HEAD:  Atraumatic, Normocephalic  EYES: EOMI, PERRLA, conjunctiva and sclera clear  NECK: Supple, no lymphadenopathy, no JVD  CHEST/LUNG: CTAB; No wheezes, rales, or rhonchi  HEART: Regular rate and rhythm; No murmurs, rubs, or gallops  ABDOMEN: Soft, non-tender, non-distended; normal bowel sounds, no organomegaly  EXTREMITIES:  2+ peripheral pulses b/l, No clubbing, cyanosis, or edema  NEUROLOGY: A&O x 3, no focal deficits  SKIN: No rashes or lesions    LABS:                        11.1   8.93  )-----------( 233      ( 07 Jul 2020 07:16 )             33.7     07-07    136  |  99  |  11  ----------------------------<  157<H>  3.5   |  23  |  0.49<L>    Ca    8.3<L>      07 Jul 2020 07:16  Phos  2.5     07-07  Mg     2.0     07-07    TPro  6.8  /  Alb  3.2<L>  /  TBili  0.4  /  DBili  x   /  AST  189<H>  /  ALT  125<H>  /  AlkPhos  122<H>  07-07      CARDIAC MARKERS ( 07 Jul 2020 07:16 )  x     / x     / 367 U/L / x     / x                RADIOLOGY & ADDITIONAL TESTS:  Results Reviewed:   Imaging Personally Reviewed:  Electrocardiogram Personally Reviewed:    COORDINATION OF CARE:  Care Discussed with Consultants/Other Providers [Y/N]:  Prior or Outpatient Records Reviewed [Y/N]:

## 2020-07-08 VITALS — WEIGHT: 92.37 LBS

## 2020-07-08 DIAGNOSIS — R63.8 OTHER SYMPTOMS AND SIGNS CONCERNING FOOD AND FLUID INTAKE: ICD-10-CM

## 2020-07-08 LAB
ANION GAP SERPL CALC-SCNC: 11 MMOL/L — SIGNIFICANT CHANGE UP (ref 5–17)
BUN SERPL-MCNC: 13 MG/DL — SIGNIFICANT CHANGE UP (ref 7–23)
CALCIUM SERPL-MCNC: 8.5 MG/DL — SIGNIFICANT CHANGE UP (ref 8.4–10.5)
CHLORIDE SERPL-SCNC: 100 MMOL/L — SIGNIFICANT CHANGE UP (ref 96–108)
CK SERPL-CCNC: 164 U/L — SIGNIFICANT CHANGE UP (ref 25–170)
CO2 SERPL-SCNC: 24 MMOL/L — SIGNIFICANT CHANGE UP (ref 22–31)
CREAT SERPL-MCNC: 0.51 MG/DL — SIGNIFICANT CHANGE UP (ref 0.5–1.3)
CULTURE RESULTS: SIGNIFICANT CHANGE UP
CULTURE RESULTS: SIGNIFICANT CHANGE UP
GLUCOSE SERPL-MCNC: 98 MG/DL — SIGNIFICANT CHANGE UP (ref 70–99)
HCT VFR BLD CALC: 32.5 % — LOW (ref 34.5–45)
HGB BLD-MCNC: 10.6 G/DL — LOW (ref 11.5–15.5)
MCHC RBC-ENTMCNC: 29.6 PG — SIGNIFICANT CHANGE UP (ref 27–34)
MCHC RBC-ENTMCNC: 32.6 GM/DL — SIGNIFICANT CHANGE UP (ref 32–36)
MCV RBC AUTO: 90.8 FL — SIGNIFICANT CHANGE UP (ref 80–100)
NRBC # BLD: 0 /100 WBCS — SIGNIFICANT CHANGE UP (ref 0–0)
PLATELET # BLD AUTO: 263 K/UL — SIGNIFICANT CHANGE UP (ref 150–400)
POTASSIUM SERPL-MCNC: 3.7 MMOL/L — SIGNIFICANT CHANGE UP (ref 3.5–5.3)
POTASSIUM SERPL-SCNC: 3.7 MMOL/L — SIGNIFICANT CHANGE UP (ref 3.5–5.3)
RBC # BLD: 3.58 M/UL — LOW (ref 3.8–5.2)
RBC # FLD: 14.6 % — HIGH (ref 10.3–14.5)
SARS-COV-2 RNA SPEC QL NAA+PROBE: SIGNIFICANT CHANGE UP
SODIUM SERPL-SCNC: 135 MMOL/L — SIGNIFICANT CHANGE UP (ref 135–145)
SPECIMEN SOURCE: SIGNIFICANT CHANGE UP
SPECIMEN SOURCE: SIGNIFICANT CHANGE UP
WBC # BLD: 8.47 K/UL — SIGNIFICANT CHANGE UP (ref 3.8–10.5)
WBC # FLD AUTO: 8.47 K/UL — SIGNIFICANT CHANGE UP (ref 3.8–10.5)

## 2020-07-08 PROCEDURE — 80076 HEPATIC FUNCTION PANEL: CPT

## 2020-07-08 PROCEDURE — 87633 RESP VIRUS 12-25 TARGETS: CPT

## 2020-07-08 PROCEDURE — 83605 ASSAY OF LACTIC ACID: CPT

## 2020-07-08 PROCEDURE — 85014 HEMATOCRIT: CPT

## 2020-07-08 PROCEDURE — 86706 HEP B SURFACE ANTIBODY: CPT

## 2020-07-08 PROCEDURE — 82962 GLUCOSE BLOOD TEST: CPT

## 2020-07-08 PROCEDURE — 87040 BLOOD CULTURE FOR BACTERIA: CPT

## 2020-07-08 PROCEDURE — 82330 ASSAY OF CALCIUM: CPT

## 2020-07-08 PROCEDURE — 84484 ASSAY OF TROPONIN QUANT: CPT

## 2020-07-08 PROCEDURE — 71275 CT ANGIOGRAPHY CHEST: CPT

## 2020-07-08 PROCEDURE — 82947 ASSAY GLUCOSE BLOOD QUANT: CPT

## 2020-07-08 PROCEDURE — 97163 PT EVAL HIGH COMPLEX 45 MIN: CPT

## 2020-07-08 PROCEDURE — 71045 X-RAY EXAM CHEST 1 VIEW: CPT

## 2020-07-08 PROCEDURE — 93970 EXTREMITY STUDY: CPT

## 2020-07-08 PROCEDURE — 82803 BLOOD GASES ANY COMBINATION: CPT

## 2020-07-08 PROCEDURE — 83735 ASSAY OF MAGNESIUM: CPT

## 2020-07-08 PROCEDURE — 84295 ASSAY OF SERUM SODIUM: CPT

## 2020-07-08 PROCEDURE — 93975 VASCULAR STUDY: CPT | Mod: 26

## 2020-07-08 PROCEDURE — 85652 RBC SED RATE AUTOMATED: CPT

## 2020-07-08 PROCEDURE — 76700 US EXAM ABDOM COMPLETE: CPT

## 2020-07-08 PROCEDURE — 84100 ASSAY OF PHOSPHORUS: CPT

## 2020-07-08 PROCEDURE — 84132 ASSAY OF SERUM POTASSIUM: CPT

## 2020-07-08 PROCEDURE — 72170 X-RAY EXAM OF PELVIS: CPT

## 2020-07-08 PROCEDURE — 80053 COMPREHEN METABOLIC PANEL: CPT

## 2020-07-08 PROCEDURE — U0003: CPT

## 2020-07-08 PROCEDURE — 80048 BASIC METABOLIC PNL TOTAL CA: CPT

## 2020-07-08 PROCEDURE — 80061 LIPID PANEL: CPT

## 2020-07-08 PROCEDURE — 86665 EPSTEIN-BARR CAPSID VCA: CPT

## 2020-07-08 PROCEDURE — 87798 DETECT AGENT NOS DNA AMP: CPT

## 2020-07-08 PROCEDURE — 86664 EPSTEIN-BARR NUCLEAR ANTIGEN: CPT

## 2020-07-08 PROCEDURE — 97116 GAIT TRAINING THERAPY: CPT

## 2020-07-08 PROCEDURE — 87389 HIV-1 AG W/HIV-1&-2 AB AG IA: CPT

## 2020-07-08 PROCEDURE — 99239 HOSP IP/OBS DSCHRG MGMT >30: CPT

## 2020-07-08 PROCEDURE — 80074 ACUTE HEPATITIS PANEL: CPT

## 2020-07-08 PROCEDURE — 99285 EMERGENCY DEPT VISIT HI MDM: CPT | Mod: 25

## 2020-07-08 PROCEDURE — 87486 CHLMYD PNEUM DNA AMP PROBE: CPT

## 2020-07-08 PROCEDURE — 87086 URINE CULTURE/COLONY COUNT: CPT

## 2020-07-08 PROCEDURE — 87581 M.PNEUMON DNA AMP PROBE: CPT

## 2020-07-08 PROCEDURE — 86663 EPSTEIN-BARR ANTIBODY: CPT

## 2020-07-08 PROCEDURE — 82728 ASSAY OF FERRITIN: CPT

## 2020-07-08 PROCEDURE — 82607 VITAMIN B-12: CPT

## 2020-07-08 PROCEDURE — 74177 CT ABD & PELVIS W/CONTRAST: CPT

## 2020-07-08 PROCEDURE — 97530 THERAPEUTIC ACTIVITIES: CPT

## 2020-07-08 PROCEDURE — 82550 ASSAY OF CK (CPK): CPT

## 2020-07-08 PROCEDURE — 81001 URINALYSIS AUTO W/SCOPE: CPT

## 2020-07-08 PROCEDURE — 82435 ASSAY OF BLOOD CHLORIDE: CPT

## 2020-07-08 PROCEDURE — 86769 SARS-COV-2 COVID-19 ANTIBODY: CPT

## 2020-07-08 PROCEDURE — 83036 HEMOGLOBIN GLYCOSYLATED A1C: CPT

## 2020-07-08 PROCEDURE — 94640 AIRWAY INHALATION TREATMENT: CPT

## 2020-07-08 PROCEDURE — 86140 C-REACTIVE PROTEIN: CPT

## 2020-07-08 PROCEDURE — 93005 ELECTROCARDIOGRAM TRACING: CPT | Mod: XU

## 2020-07-08 PROCEDURE — 85027 COMPLETE CBC AUTOMATED: CPT

## 2020-07-08 PROCEDURE — 85379 FIBRIN DEGRADATION QUANT: CPT

## 2020-07-08 PROCEDURE — 76700 US EXAM ABDOM COMPLETE: CPT | Mod: 26,59

## 2020-07-08 PROCEDURE — 70450 CT HEAD/BRAIN W/O DYE: CPT

## 2020-07-08 PROCEDURE — 84443 ASSAY THYROID STIM HORMONE: CPT

## 2020-07-08 PROCEDURE — 87493 C DIFF AMPLIFIED PROBE: CPT

## 2020-07-08 PROCEDURE — 51701 INSERT BLADDER CATHETER: CPT

## 2020-07-08 PROCEDURE — 84145 PROCALCITONIN (PCT): CPT

## 2020-07-08 PROCEDURE — 93975 VASCULAR STUDY: CPT

## 2020-07-08 PROCEDURE — 82746 ASSAY OF FOLIC ACID SERUM: CPT

## 2020-07-08 RX ORDER — SIMVASTATIN 20 MG/1
1 TABLET, FILM COATED ORAL
Qty: 0 | Refills: 0 | DISCHARGE

## 2020-07-08 RX ORDER — TIOTROPIUM BROMIDE 18 UG/1
1 CAPSULE ORAL; RESPIRATORY (INHALATION)
Qty: 0 | Refills: 0 | DISCHARGE
Start: 2020-07-08

## 2020-07-08 RX ORDER — LOSARTAN POTASSIUM 100 MG/1
1 TABLET, FILM COATED ORAL
Qty: 0 | Refills: 0 | DISCHARGE
Start: 2020-07-08

## 2020-07-08 RX ORDER — MEMANTINE HYDROCHLORIDE 10 MG/1
1 TABLET ORAL
Qty: 0 | Refills: 0 | DISCHARGE
Start: 2020-07-08

## 2020-07-08 RX ORDER — LOSARTAN POTASSIUM 100 MG/1
1 TABLET, FILM COATED ORAL
Qty: 0 | Refills: 0 | DISCHARGE

## 2020-07-08 RX ORDER — MEMANTINE HYDROCHLORIDE 10 MG/1
1 TABLET ORAL
Qty: 0 | Refills: 0 | DISCHARGE

## 2020-07-08 RX ADMIN — MEMANTINE HYDROCHLORIDE 10 MILLIGRAM(S): 10 TABLET ORAL at 17:09

## 2020-07-08 RX ADMIN — ENOXAPARIN SODIUM 30 MILLIGRAM(S): 100 INJECTION SUBCUTANEOUS at 12:42

## 2020-07-08 RX ADMIN — MEMANTINE HYDROCHLORIDE 10 MILLIGRAM(S): 10 TABLET ORAL at 05:31

## 2020-07-08 NOTE — DIETITIAN INITIAL EVALUATION ADULT. - OTHER INFO
Attempted to visit pt this morning, Cantonese-speaking, however pt confused and disoriented, unable to utilize  services at this time. Observed untouched meal tray at bedside. Per flowsheets, pt with variable PO intake since admission (0-75% of documented meals). Nephew (Jaime) reports pt has been calling him and telling him that she is "waiting for God to bring her food," and states she is thinking "some foods are bad," attributes to altered mental status/dementia. Noted pt with fecal incontinence. Last BM today (7/8) per flowsheets.     Per nephew Jaime, pt with decreased appetite and intake x ~2 months PTA. Reports pt was eating less than usual, mostly bread (bites) and 2-3 bowls of soup daily. Reports pt was eating less so started increasing Glucerna intake to 2-3 daily (previously was drinking 1 daily). Jaime denies pt with any difficulties chewing or swallowing, reports pt was eating regular-textured foods and thin liquids at home. Confirmed NKFA. No vitamin/mineral supplementation reported. Per Jaime, pt was not checking glucose fingersticks at home due to A1c being in good range (6.6%). Pt taking Glipizide PTA.     Jaime unsure of any changes in pt's weight, reports pt was unable to see MD/be weighed recently due to COVID pandemic. Does not know UBW. [facility] 92.[facility] (12/21/2016). Current in-house weights 92.5 pounds (7/3 bed scale weight) and 94.7 pounds (7/8 bed scale weight) indicate weight stability. Will continue to monitor and trend weights.     Discussed with Jaime role of RD and ways to optimize PO intake in-house. Amenable to provision of Glucerna oral nutrition supplement to optimize calorie and protein intake. Obtained pt's food preferences to encourage intake. Jaime made aware RD remains available and will continue to obtain/honor food preferences as able. Jaime with no nutrition-related questions or concerns at this time.

## 2020-07-08 NOTE — DIETITIAN INITIAL EVALUATION ADULT. - ADD RECOMMEND
1) Consider formal swallowing evaluation with speech pathologist 2) Suggest multivitamin supplementation to optimize nutrient intake (if no medical contraindications) 3) Continue to provide assistance and encouragement at mealtimes as needed 4) Monitor PO intake, weight, labs, skin, GI status, diet

## 2020-07-08 NOTE — PROGRESS NOTE ADULT - PROBLEM SELECTOR PLAN 4
CK 93747 on admission; unclear etiology as nephew negative for falls, trauma or strenuous exertion; possibly 2/2 to infectious status  - 7/8  today   - Aricept and Statin [home meds] consider restarting

## 2020-07-08 NOTE — DIETITIAN INITIAL EVALUATION ADULT. - REASON INDICATOR FOR ASSESSMENT
Pt seen for length of stay assessment. Source: comprehensive chart review, patient's nephew (Jaime). Pt is a 89 yo female with PMH of HTN, T2DM, dementia, who presented with weakness/fatigue, admitted 7/3. Found with DVT.

## 2020-07-08 NOTE — PROGRESS NOTE ADULT - SUBJECTIVE AND OBJECTIVE BOX
PROGRESS NOTE:     Contact Information:  Michaela DO Kathryn PGY1   Pager [phone] [facility] | [facility] #[id number]  Please call cover for TEAM 2 if after hours     Patient is a 89y old  Female who presents with a chief complaint of weakness, fatigue (08 Jul 2020 08:48)      OVERNIGHT EVENTS: NOY.  SUBJECTIVE: Patient was asleep and did not want to interact much this morning. Breathing on RA. No coughing reported by nursing. Per team discussion, family came by yesterday evening and mentioned patient has been severely demented for past 4-5 years. She has not made sense communicating with family since then.    ADDITIONAL REVIEW OF SYSTEMS: see above       MEDICATIONS  (STANDING):  dextrose 5%. 1000 milliLiter(s) (50 mL/Hr) IV Continuous <Continuous>  dextrose 50% Injectable 12.5 Gram(s) IV Push once  dextrose 50% Injectable 25 Gram(s) IV Push once  dextrose 50% Injectable 25 Gram(s) IV Push once  enoxaparin Injectable 30 milliGRAM(s) SubCutaneous daily  insulin lispro (HumaLOG) corrective regimen sliding scale   SubCutaneous three times a day before meals  insulin lispro (HumaLOG) corrective regimen sliding scale   SubCutaneous at bedtime  losartan 50 milliGRAM(s) Oral daily  memantine 10 milliGRAM(s) Oral two times a day  tiotropium 18 MICROgram(s) Capsule 1 Capsule(s) Inhalation daily    MEDICATIONS  (PRN):  acetaminophen    Suspension .. 650 milliGRAM(s) Oral every 6 hours PRN Temp greater or equal to 38C (100.4F)  dextrose 40% Gel 15 Gram(s) Oral once PRN Blood Glucose LESS THAN 70 milliGRAM(s)/deciliter  glucagon  Injectable 1 milliGRAM(s) IntraMuscular once PRN Glucose LESS THAN 70 milligrams/deciliter    Allergies ; No Known Allergies    PHYSICAL EXAM:  Vital Signs Last 24 Hrs  T(C): 36.4 (08 Jul 2020 05:25), Max: 37.4 (07 Jul 2020 21:25)  T(F): 97.5 (08 Jul 2020 05:25), Max: 99.3 (07 Jul 2020 21:25)  HR: 73 (08 Jul 2020 05:25) (64 - 98)  BP: 152/62 (08 Jul 2020 05:25) (135/77 - 152/62)  BP(mean): --  RR: 18 (08 Jul 2020 05:25) (18 - 20)  SpO2: 97% (08 Jul 2020 05:25) (95% - 98%)    GENERAL: No acute distress, well-developed  HEAD:  Atraumatic, Normocephalic  EYES: EOMI, PERRLA, conjunctiva and sclera clear  EARS: symmetrical, no tenderness, no discharge  NECK: Supple, no lymphadenopathy, no JVD  CHEST/LUNG: CTAB; No wheezes, rales, or rhonchi  HEART: Regular rate and rhythm; No murmurs, rubs, or gallops  ABDOMEN: Soft, non-tender, non-distended; normal bowel sounds, no organomegaly  EXTREMITIES:  2+ peripheral pulses b/l, No clubbing, cyanosis, or edema  NEUROLOGY: alert, unable to access orientation 2/2 mental status, no focal deficits  SKIN: No rashes or lesions    PATIENT DATA:  CAPILLARY BLOOD GLUCOSE      POCT Blood Glucose.: 117 mg/dL (07 Jul 2020 21:30)  POCT Blood Glucose.: 120 mg/dL (07 Jul 2020 17:38)  POCT Blood Glucose.: 119 mg/dL (07 Jul 2020 13:50)  POCT Blood Glucose.: 113 mg/dL (07 Jul 2020 10:06)    I&O's Summary    07 Jul 2020 07:01  -  08 Jul 2020 07:00  --------------------------------------------------------  IN: 620 mL / OUT: 0 mL / NET: 620 mL        LABS:                        10.6   8.47  )-----------( 263      ( 08 Jul 2020 06:32 )             32.5     07-08    135  |  100  |  13  ----------------------------<  98  3.7   |  24  |  0.51    Ca    8.5      08 Jul 2020 06:32  Phos  2.5     07-07  Mg     2.0     07-07    TPro  6.8  /  Alb  3.2<L>  /  TBili  0.4  /  DBili  x   /  AST  189<H>  /  ALT  125<H>  /  AlkPhos  122<H>  07-07      CARDIAC MARKERS ( 08 Jul 2020 06:32 )  x     / x     / 164 U/L / x     / x      CARDIAC MARKERS ( 07 Jul 2020 07:16 )  x     / x     / 367 U/L / x     / x          RADIOLOGY & ADDITIONAL TESTS:  < from: Xray Chest 1 View- PORTABLE-Routine (07.07.20 @ 09:34) >  Interval development of bilateral patchy opacities with trace bilateral pleural effusions, likely representing pulmonary edema.      COORDINATION OF CARE:   Consultants Status: ID note appreciated  Telemetry: No PROGRESS NOTE:     Contact Information:  Michaela DO Kathryn PGY1   Pager [phone] [facility] | [facility] #[id number]  Please call cover for TEAM 2 if after hours     Patient is a 89y old  Female who presents with a chief complaint of weakness, fatigue (08 Jul 2020 08:48)      OVERNIGHT EVENTS: NOY.  SUBJECTIVE: Patient was asleep and did not want to interact much this morning. Breathing on RA. No coughing reported by nursing. Per team discussion, family came by yesterday evening and mentioned patient has been severely demented for past 4-5 years. She has not made sense communicating with family since then.    ADDITIONAL REVIEW OF SYSTEMS: see above       MEDICATIONS  (STANDING):  dextrose 5%. 1000 milliLiter(s) (50 mL/Hr) IV Continuous <Continuous>  dextrose 50% Injectable 12.5 Gram(s) IV Push once  dextrose 50% Injectable 25 Gram(s) IV Push once  dextrose 50% Injectable 25 Gram(s) IV Push once  enoxaparin Injectable 30 milliGRAM(s) SubCutaneous daily  insulin lispro (HumaLOG) corrective regimen sliding scale   SubCutaneous three times a day before meals  insulin lispro (HumaLOG) corrective regimen sliding scale   SubCutaneous at bedtime  losartan 50 milliGRAM(s) Oral daily  memantine 10 milliGRAM(s) Oral two times a day  tiotropium 18 MICROgram(s) Capsule 1 Capsule(s) Inhalation daily    MEDICATIONS  (PRN):  acetaminophen    Suspension .. 650 milliGRAM(s) Oral every 6 hours PRN Temp greater or equal to 38C (100.4F)  dextrose 40% Gel 15 Gram(s) Oral once PRN Blood Glucose LESS THAN 70 milliGRAM(s)/deciliter  glucagon  Injectable 1 milliGRAM(s) IntraMuscular once PRN Glucose LESS THAN 70 milligrams/deciliter    Allergies ; No Known Allergies    PHYSICAL EXAM:  Vital Signs Last 24 Hrs  T(C): 36.4 (08 Jul 2020 05:25), Max: 37.4 (07 Jul 2020 21:25)  T(F): 97.5 (08 Jul 2020 05:25), Max: 99.3 (07 Jul 2020 21:25)  HR: 73 (08 Jul 2020 05:25) (64 - 98)  BP: 152/62 (08 Jul 2020 05:25) (135/77 - 152/62)  BP(mean): --  RR: 18 (08 Jul 2020 05:25) (18 - 20)  SpO2: 97% (08 Jul 2020 05:25) (95% - 98%)    GENERAL: No acute distress, well-developed  HEAD:  Atraumatic, Normocephalic  EYES: EOMI, PERRLA, conjunctiva and sclera clear  EARS: symmetrical, no tenderness, no discharge  NECK: Supple, no lymphadenopathy, no JVD  CHEST/LUNG: CTAB; No wheezes, rales, or rhonchi  HEART: Regular rate and rhythm; No murmurs, rubs, or gallops  ABDOMEN: Soft, non-tender, non-distended; normal bowel sounds, no organomegaly  EXTREMITIES:  2+ peripheral pulses b/l, No clubbing, cyanosis, or edema  NEUROLOGY: alert, unable to access orientation 2/2 mental status, no focal deficits  SKIN: No rashes or lesions    PATIENT DATA:  CAPILLARY BLOOD GLUCOSE      POCT Blood Glucose.: 117 mg/dL (07 Jul 2020 21:30)  POCT Blood Glucose.: 120 mg/dL (07 Jul 2020 17:38)  POCT Blood Glucose.: 119 mg/dL (07 Jul 2020 13:50)  POCT Blood Glucose.: 113 mg/dL (07 Jul 2020 10:06)    I&O's Summary    07 Jul 2020 07:01  -  08 Jul 2020 07:00  --------------------------------------------------------  IN: 620 mL / OUT: 0 mL / NET: 620 mL        LABS:                        10.6   8.47  )-----------( 263      ( 08 Jul 2020 06:32 )             32.5     07-08    135  |  100  |  13  ----------------------------<  98  3.7   |  24  |  0.51    Ca    8.5      08 Jul 2020 06:32  Phos  2.5     07-07  Mg     2.0     07-07    TPro  6.8  /  Alb  3.2<L>  /  TBili  0.4  /  DBili  x   /  AST  189<H>  /  ALT  125<H>  /  AlkPhos  122<H>  07-07    MICROBIOLOGY  Blood Cultures 07/03 FINAL: No Growth     RADIOLOGY & ADDITIONAL TESTS:  < from: Xray Chest 1 View- PORTABLE-Routine (07.07.20 @ 09:34) >  Interval development of bilateral patchy opacities with trace bilateral pleural effusions, likely representing pulmonary edema.    COORDINATION OF CARE:   Consultants Status: ID note appreciated  Telemetry: No

## 2020-07-08 NOTE — PROGRESS NOTE ADULT - PROVIDER SPECIALTY LIST ADULT
Infectious Disease
Infectious Disease
Internal Medicine

## 2020-07-08 NOTE — PROGRESS NOTE ADULT - REASON FOR ADMISSION
weakness, fatigue
fever and rhabdomyolysis
weakness, fatigue
weakness, fatigue, fever

## 2020-07-08 NOTE — PROGRESS NOTE ADULT - ASSESSMENT
89F with PMH of HTN, T2DM, and dementia (unclear baseline) BIBEMS for weakness/fatigue. She was found septic with unclear source c/b rhabdomyolysis and recurrent high degree fevers while on broad spectrum abx. R DVT below the knee identified and likely source for fever. Antibiotics were D/Jack and CPK is trending downwards.     Patient condition has improved. Stable on RA and home medications. Pending RUQ US w doppler 2/2 elevation of elevated transaminases. Outpatient placement will be required given patient mental status and weekly U/S for DVT.

## 2020-07-08 NOTE — PROGRESS NOTE ADULT - ATTENDING COMMENTS
pt seen and examined.  above plan discussed on rounds today.  In addition,    Rhabdo - resolved. stopped IV fluids  Fevers - pt found to have an acute DVT, will monitor off of abx. ID f/u appreciated  Dementia - I attempted to communicate with the patient with both a Mandarin and a Cantonese  and both were unsuccessful at communicating with the patient. I spoke with Niece and patient appeared to be at her baseline.  stable for discharge to [facility].  Discharge time spent: 32 min

## 2020-07-08 NOTE — DIETITIAN INITIAL EVALUATION ADULT. - FACTORS AFF FOOD INTAKE
difficulty chewing/difficulty feeding self/difficulty swallowing/change in mental status/Jain/ethnic/cultural/personal food preferences

## 2020-07-08 NOTE — DISCHARGE NOTE NURSING/CASE MANAGEMENT/SOCIAL WORK - PATIENT PORTAL LINK FT
You can access the FollowMyHealth Patient Portal offered by [facility] by registering at the following website: [email or web address]. By joining [employer]’s FollowMyHealth portal, you will also be able to view your health information using other applications (apps) compatible with our system.

## 2020-07-08 NOTE — DIETITIAN INITIAL EVALUATION ADULT. - PHYSICAL APPEARANCE
Nutrition-focused physical exam deferred at this time, pt with altered mental status and unable to utilize  services. Pt appears thin/small, likely related to age, no overt signs of muscle or fat wasting observed./other (specify) Ht: 53 inches (134.6 cm) Wt: 92.4 pounds (42 kg) (dosing wt)  BMI: 23.2 kg/m2  IBW: 86 pounds +/-10% %IBW: 107%  Skin: no pressure injuries per flowsheets   Edema: no edema per flowsheets

## 2020-07-14 ENCOUNTER — OUTPATIENT (OUTPATIENT)
Dept: OUTPATIENT SERVICES | Facility: HOSPITAL | Age: 85
LOS: 1 days | End: 2020-07-14
Payer: MEDICARE

## 2020-07-14 DIAGNOSIS — Z51.89 ENCOUNTER FOR OTHER SPECIFIED AFTERCARE: ICD-10-CM

## 2020-07-14 DIAGNOSIS — Z98.890 OTHER SPECIFIED POSTPROCEDURAL STATES: Chronic | ICD-10-CM

## 2020-07-14 PROCEDURE — 93970 EXTREMITY STUDY: CPT

## 2020-07-14 PROCEDURE — 93970 EXTREMITY STUDY: CPT | Mod: 26

## 2023-03-13 NOTE — CONSULT NOTE ADULT - CONSULT REASON
Fevers of unknown etiology Fluconazole Counseling:  Patient counseled regarding adverse effects of fluconazole including but not limited to headache, diarrhea, nausea, upset stomach, liver function test abnormalities, taste disturbance, and stomach pain.  There is a rare possibility of liver failure that can occur when taking fluconazole.  The patient understands that monitoring of LFTs and kidney function test may be required, especially at baseline. The patient verbalized understanding of the proper use and possible adverse effects of fluconazole.  All of the patient's questions and concerns were addressed.

## 2023-07-11 NOTE — ED ADULT TRIAGE NOTE - ADDITIONAL SAFETY/BANDS...
Additional Safety/Bands: Consent 3/Introductory Paragraph: I gave the patient a chance to ask questions they had about the procedure.  Following this I explained the Mohs procedure and consent was obtained. The risks, benefits and alternatives to therapy were discussed in detail. Specifically, the risks of infection, scarring, bleeding, prolonged wound healing, incomplete removal, allergy to anesthesia, nerve injury and recurrence were addressed. Prior to the procedure, the treatment site was clearly identified and confirmed by the patient.

## 2023-08-22 NOTE — H&P ADULT - PROBLEM/PLAN-7
sounds: Normal heart sounds. Pulmonary:      Effort: Pulmonary effort is normal.      Breath sounds: Normal breath sounds. No wheezing or rhonchi. Abdominal:      General: Abdomen is flat. Bowel sounds are normal.      Palpations: Abdomen is soft. Tenderness: There is no abdominal tenderness. Musculoskeletal:         General: Normal range of motion. Cervical back: Normal range of motion. Skin:     General: Skin is warm and dry. Neurological:      Mental Status: She is alert and oriented to person, place, and time. Psychiatric:         Mood and Affect: Mood normal.         Behavior: Behavior normal.         Thought Content: Thought content normal.         Judgment: Judgment normal.          ASSESSMENT/PLAN:  1. Essential (primary) hypertension  -     losartan (COZAAR) 100 MG tablet; Take one tablet once daily, Disp-90 tablet, R-0Normal  -     cloNIDine (CATAPRES) 0.1 MG tablet; Take 1 tablet by mouth twice daily as needed for bp greater than 160/90, Disp-60 tablet, R-0Normal  2. Class 2 obesity due to excess calories without serious comorbidity with body mass index (BMI) of 39.0 to 39.9 in adult  3. Elevated blood pressure reading in office with diagnosis of hypertension  4. Stress at work      Return in about 4 weeks (around 9/19/2023) for Physical, follow up with PCP-Dr. Sean Feliz. Advised to monitor her blood pressure and keep a log  Follow up with PCP for physical    PDMP Monitoring:    Last PDMP Farzana Bal as Reviewed:  Review User Review Instant Review Result          Last Controlled Substance Monitoring Documentation      Flowsheet Row Patient Message from 11/23/2022 in [facility]   Periodic Controlled Substance Monitoring No signs of potential drug abuse or diversion identified.  filed at 11/23/2022 1540          Urine Drug Screenings (1 yr)       POCT Rapid Drug Screen  Collected: 8/3/2021 (Final result)                  Medication Contract and Consent for Opioid Use DISPLAY PLAN FREE TEXT